# Patient Record
Sex: FEMALE | Race: WHITE | NOT HISPANIC OR LATINO | Employment: FULL TIME | URBAN - METROPOLITAN AREA
[De-identification: names, ages, dates, MRNs, and addresses within clinical notes are randomized per-mention and may not be internally consistent; named-entity substitution may affect disease eponyms.]

---

## 2020-01-31 ENCOUNTER — TELEPHONE (OUTPATIENT)
Dept: MAMMOGRAPHY | Facility: CLINIC | Age: 65
End: 2020-01-31

## 2020-01-31 NOTE — TELEPHONE ENCOUNTER
Left message for patient to call back. She needs to be aware to bring her images from [place] to her appointment with Dr. Hodges.

## 2020-02-04 ENCOUNTER — OFFICE VISIT (OUTPATIENT)
Dept: MAMMOGRAPHY | Facility: CLINIC | Age: 65
End: 2020-02-04

## 2020-02-04 VITALS
BODY MASS INDEX: 25.27 KG/M2 | HEIGHT: 67 IN | SYSTOLIC BLOOD PRESSURE: 140 MMHG | DIASTOLIC BLOOD PRESSURE: 80 MMHG | WEIGHT: 161 LBS

## 2020-02-04 DIAGNOSIS — Z90.10 POSTMASTECTOMY LYMPHANGIOSARCOMA SYNDROME, UNSPECIFIED LATERALITY (HCC): Primary | ICD-10-CM

## 2020-02-04 DIAGNOSIS — C50.919 POSTMASTECTOMY LYMPHANGIOSARCOMA SYNDROME, UNSPECIFIED LATERALITY (HCC): Primary | ICD-10-CM

## 2020-02-04 DIAGNOSIS — D05.10 DUCTAL CARCINOMA IN SITU (DCIS) OF BREAST, UNSPECIFIED LATERALITY: Primary | ICD-10-CM

## 2020-02-04 DIAGNOSIS — D05.11 DUCTAL CARCINOMA IN SITU (DCIS) OF RIGHT BREAST: Primary | ICD-10-CM

## 2020-02-04 PROCEDURE — 99205 OFFICE O/P NEW HI 60 MIN: CPT | Performed by: SURGERY

## 2020-02-04 RX ORDER — LEVETIRACETAM 500 MG/1
750 TABLET ORAL 2 TIMES DAILY
COMMUNITY
Start: 2019-06-19

## 2020-02-04 RX ORDER — VIT A/VIT C/VIT E/ZINC/COPPER 7160-113
TABLET, DELAYED RELEASE (ENTERIC COATED) ORAL
COMMUNITY
End: 2021-08-03

## 2020-02-04 NOTE — PROGRESS NOTES
Chief Complaint: Honey Avila is a 64 y.o.. female here today for Breast Cancer (Right DCIS)        History of Present Illness:  Patient presents with newly diagnosed DCIS. Right.  She is a very nice 64-year-old who is been having some short-term follow-up for abnormalities in the right breast.  Her most recent films revealed some some calcifications in the lower outer quadrant of the right breast that spanned a 1.3 cm area.  They were pleomorphic and felt to be suspicious.  An ultrasound of that right breast was obtained and there were no abnormalities.  The left breast also looked okay.  Biopsy for these abnormalities was recommended and has revealed high-grade DCIS that is ER negative and ID negative.  The largest measured extent of DCIS is 6 mm in a single core.  I have personally reviewed those imaging studies.  The calcifications are definitely pleomorphic and have a linear configuration.  I do agree that biopsy was indicated because these were suspicious.    The patient's family history is significant for a mother who had breast cancer in her 70s and a sister who had breast cancer at 63.  She also has a father with prostate cancer in his 70s.  To her knowledge there is been no definite genetic testing performed.    Despite multiple callback mammogram she has not had any prior breast biopsies.      Review of Systems:  Review of Systems   Skin:        The patient denies any noticeable changes to the skin of the breast.    All other systems reviewed and are negative.     I have reviewed the ROS as documented by the MA/LPN/RN Jerome Hodges MD      Past Medical and Surgical History:  Breast Biopsy History:  Patient has had the following breast biopsies:1/2020 Right-malignant  Breast Cancer HIstory:  Patient does not have a past medical history of breast cancer.  Breast Operations, and year:  None  Social History     Tobacco Use   Smoking Status Never Smoker     Obstetric History:  Patient is  postmenopausal, entered menopause naturally at age: 51   Number of pregnancies:3  Number of live births: 2  Number of abortions or miscarriages: 1  Age of delivery of first child: 34  Patient breast fed, for the following lenth of time:6 months  Length of time taking birth control pills:n/a  Patient has never taken hormone replacement    Past Surgical History:   Procedure Laterality Date   • BREAST BIOPSY Right 01/2020    malignant   • KNEE SURGERY      Both knees       Past Medical History:   Diagnosis Date   • Breast cancer (CMS/[facility]) 01/2020    Right DCIS   • Epilepsy (CMS/[facility])        Prior Hospitalizations, other than for surgery or childbirth, and year:  None    Social History:  Patient is .  Patient has one daughters. and Patient has one sons.    Family History:  Family History   Problem Relation Age of Onset   • Heart failure Mother    • Breast cancer Mother    • Heart failure Father    • Prostate cancer Father    • Breast cancer Sister        Vital Signs:  Vitals:    02/04/20 1135   BP: 140/80       Medications:    Current Outpatient Prescriptions:     Current Outpatient Medications:   •  levETIRAcetam (KEPPRA) 500 MG tablet, Take 500 mg by mouth 3 (Three) Times a Day., Disp: , Rfl:   •  Multiple Vitamins-Minerals (PRESERVISION AREDS 2 PO), Take  by mouth., Disp: , Rfl:   •  triamcinolone (KENALOG) 0.1 % ointment, APPLY TO AFFECTED AREA EVERY DAY X 2 WEEKS, Disp: , Rfl:   •  B COMPLEX VITAMINS ER PO, Take  by mouth., Disp: , Rfl:   •  Cholecalciferol (VITAMIN D-1000 MAX ST) 25 MCG (1000 UT) tablet, Take 2,000 Units by mouth Daily., Disp: , Rfl:   •  Multiple Vitamins-Minerals (ICAPS) tablet, Take  by mouth., Disp: , Rfl:     Physical Examination:  General Appearance:   Patient is in no distress.  She is well kept and has an average build.   Psychiatric:  Patient with appropriate mood and affect. Alert and oriented to self, time, and place.    Breast, RIGHT:  medium sized, symmetric with the  contralateral side.  Breast skin is without erythema, edema, rashes.  There is some bruising in the lower outer quadrant from her biopsy.  There are no visible abnormalities upon inspection during the arm-raising maneuver or with hands on hips in the sitting position. There is no nipple retraction, discharge or nipple/areolar skin changes.there is a palpable mass in that lower outer quadrant that measures about 2 cm and appears to be post biopsy related.    Breast, LEFT:  medium sized, symmetric with the contralateral side.  Breast skin is without erythema, edema, rashes.  There are no visible abnormalities upon inspection during the arm-raising maneuver or with hands on hips in the sitting position. There is no nipple retraction, discharge or nipple/areolar skin changes.There are no masses palpable in the sitting or supine positions.    Lymphatic:  There is no axillary, cervical, infraclavicular, or supraclavicular adenopathy bilaterally.  Eyes:  Pupils are round and reactive to light.  Cardiovascular:  Heart rate and rhythm are regular.  Respiratory:  Lungs are clear bilaterally with no crackles or wheezes in any lung field.  Gastrointestinal:  Abdomen is soft, nondistended, and nontender.  There was no obvious hepatosplenomegaly or abdominal mass.  There are no scars from previous surgery.    Musculoskeletal:  Good strength in all 4 extremities.   There is good range of motion in both shoulders.    Skin:  No new skin lesions or rashes on the skin excluding the breast (see breast exam above).    Cancer Staging  Primary Tumor: TIS  Regional Lymph Nodes:  N0  Distant Mets: M0  Clinical Stage Group: 0    Assessment:  1. Ductal carcinoma in situ (DCIS) of right breast          Plan:  She was accompanied today by her .  The office visit lasted 55 minutes with 47 minutes spent in face-to-face consultation.    We began the conversation discussing her pathology report.  We talked about the origin of most of breast  cancers from either the ducts or the lobules.  The difference between invasive and in situ disease was explained and the visual was drawn for her.  When invasion has occurred, the lymph nodes need to be evaluated.  When there is in situ disease the lymph nodes should be considered if the area of concern is widespread or it is high-grade.  We also discussed the significance of hormone receptors.  They often can give us some idea how the breast cancer will behave and potentially lead us to offer neoadjuvant chemotherapy.    Next we discussed the surgical options for DCIS which include breast conserving therapy versus a mastectomy.  With breast conserving therapy we are talking about a lumpectomy with margins and radiation treatment.  The radiation treatment is generally given to the entire breast for 6 weeks.  The side effects consist of local skin change and potential injury to nearby structures such as the lung or the heart.  A mastectomy would involve removing the breast tissues with preservation of the pectoral muscles and evaluation of the lymph nodes if indicated.  The potential for reconstruction by either an implant or autologous tissue was discussed.  This could be performed on a delayed basis or immediately depending on a multitude of factors.  The survival rates for these 2 procedures are equivalent but there is a slight increased risk of local recurrence following breast conserving surgery.  There are incidences where one may be favored over the other.  There are times when a lumpectomy is not possible due to the large tumor to breast ratio or the location of the tumor.  Previous radiation to the chest wall or collagen disorders such as scleroderma would make radiation treatment and possible and therefore exclude breast conserving therapy as an option.    I think it would probably be wise to obtain an MRI and to get genetics.  The patient is not totally sure which direction she would go from the surgical  standpoint.  She has had significant callbacks for abnormalities in the right breast and that would not change if she had breast conserving surgery.  On the other hand, a mastectomy particularly without reconstruction would be deforming and potentially create some balance issues.  She and her  will talk about these things while these other results are coming back.  CPT coding:    Next Appointment:  No follow-ups on file.            EMR Dragon/transcription disclaimer:    Much of this encounter note is an electronic transcription/translocation of spoken language to printed text.  The electronic translation of spoken language may permit erroneous, or at times, nonsensical words or phrases to be inadvertently transcribed.  Although I have reviewed the note from such areas, some may still exist.

## 2020-02-05 ENCOUNTER — DOCUMENTATION (OUTPATIENT)
Dept: MAMMOGRAPHY | Facility: CLINIC | Age: 65
End: 2020-02-05

## 2020-02-05 NOTE — PROGRESS NOTES
Sent letter to [facility] for them to obtain breast biopsy slides from [facility] for a second review.

## 2020-02-06 ENCOUNTER — TELEPHONE (OUTPATIENT)
Dept: MAMMOGRAPHY | Facility: CLINIC | Age: 65
End: 2020-02-06

## 2020-02-06 ENCOUNTER — LAB REQUISITION (OUTPATIENT)
Dept: LAB | Facility: HOSPITAL | Age: 65
End: 2020-02-06

## 2020-02-06 DIAGNOSIS — Z00.00 ENCOUNTER FOR GENERAL ADULT MEDICAL EXAMINATION WITHOUT ABNORMAL FINDINGS: ICD-10-CM

## 2020-02-06 NOTE — TELEPHONE ENCOUNTER
Attempted to call patient to set up a time for her to come in the office for genetic testing. I left a voice mail.

## 2020-02-07 ENCOUNTER — TELEPHONE (OUTPATIENT)
Dept: OTHER | Facility: HOSPITAL | Age: 65
End: 2020-02-07

## 2020-02-08 LAB
LAB AP CASE REPORT: NORMAL
LAB AP DIAGNOSIS COMMENT: NORMAL
PATH REPORT.ADDENDUM SPEC: NORMAL
PATH REPORT.FINAL DX SPEC: NORMAL
PATH REPORT.GROSS SPEC: NORMAL

## 2020-02-08 NOTE — TELEPHONE ENCOUNTER
Referral received from Dr. Hodges's office. I called Ms. Avila and introduced myself and navigational services. She stated her consult went well and she has a good understanding of her pathology and treatment options. She would like to have genetic testing and MRI done prior to making a surgery decision and those labs were drawn in Dr. Hodges's office. We set up a genetic counseling appointment with Dr. Henderson on March 24th and we discussed what that entails. She stated she is scheduled for a MRI on Feb 13th and was agreeable to meet afterward to go over navigational services and resources available. She has my contact number if any needs arise

## 2020-02-13 ENCOUNTER — NURSE NAVIGATOR (OUTPATIENT)
Dept: OTHER | Facility: HOSPITAL | Age: 65
End: 2020-02-13

## 2020-02-13 ENCOUNTER — HOSPITAL ENCOUNTER (OUTPATIENT)
Dept: MRI IMAGING | Facility: HOSPITAL | Age: 65
Discharge: HOME | End: 2020-02-13
Admitting: SURGERY

## 2020-02-13 DIAGNOSIS — D05.11 DUCTAL CARCINOMA IN SITU (DCIS) OF RIGHT BREAST: ICD-10-CM

## 2020-02-13 PROCEDURE — A9577 INJ MULTIHANCE: HCPCS | Performed by: SURGERY

## 2020-02-13 PROCEDURE — 77049 MRI BREAST C-+ W/CAD BI: CPT

## 2020-02-13 PROCEDURE — 82565 ASSAY OF CREATININE: CPT

## 2020-02-13 PROCEDURE — 0 GADOBENATE DIMEGLUMINE 529 MG/ML SOLUTION: Performed by: SURGERY

## 2020-02-13 RX ADMIN — GADOBENATE DIMEGLUMINE 15 ML: 529 INJECTION, SOLUTION INTRAVENOUS at 20:19

## 2020-02-13 NOTE — PROGRESS NOTES
Met Ms. Avila for an appointment today. I introduced myself and navigational services. She is scheduled for a MRI this evening and is waiting for her genetic testing results to come in. She will know more about plan of care afterward.  Her consult with Dr. Hodges went well and she has a good understanding of her pathology and treatment options. She had hector questions about her pathology and we went back over those together.      We discussed her support system and she stated her sister and her mother both had breast cancer and they have been very supportive. We also discussed integrative therapies and other services at the [facility]. I gave her a navigation folder with the following information:     [facility] Cancer Support Network, Sharing Our Stories Breast Cancer Support Group, Cancer and Restorative Exercise (CARE), [facility] program, Together for Breast Cancer Survival, For Women Facing Breast Cancer, Road to Recovery, Bioimpedeance, [facility], Massage Therapy, Reiki Therapy, [facility] [place], Cancer Nutrition, Survivorship Clinic, and Genetic Counseling.     She verbalized appreciation for navigational services and she has my contact information and will call with any questions that arise.

## 2020-02-14 ENCOUNTER — TELEPHONE (OUTPATIENT)
Dept: MAMMOGRAPHY | Facility: CLINIC | Age: 65
End: 2020-02-14

## 2020-02-14 DIAGNOSIS — D05.11 DUCTAL CARCINOMA IN SITU (DCIS) OF RIGHT BREAST: Primary | ICD-10-CM

## 2020-02-14 LAB — CREAT BLDA-MCNC: 0.7 MG/DL (ref 0.6–1.3)

## 2020-02-14 NOTE — TELEPHONE ENCOUNTER
She called back and I told her the MRI showed a 4.4 cm area of suspicious enhancement around the clip.  I think she would benefit from a mastectomy.  The other breast looked fine and we plan on just doing the right breast.  A referral has been made to Dr. Gore

## 2020-02-20 ENCOUNTER — PREP FOR SURGERY (OUTPATIENT)
Dept: OTHER | Facility: HOSPITAL | Age: 65
End: 2020-02-20

## 2020-02-20 DIAGNOSIS — D05.11 DUCTAL CARCINOMA IN SITU (DCIS) OF RIGHT BREAST: Primary | ICD-10-CM

## 2020-02-20 RX ORDER — LIDOCAINE AND PRILOCAINE 25; 25 MG/G; MG/G
CREAM TOPICAL ONCE
Status: CANCELLED | OUTPATIENT
Start: 2020-03-19 | End: 2020-02-20

## 2020-02-20 RX ORDER — ACETAMINOPHEN 500 MG
1000 TABLET ORAL ONCE
Status: CANCELLED | OUTPATIENT
Start: 2020-03-19 | End: 2020-02-20

## 2020-02-20 RX ORDER — CLINDAMYCIN PHOSPHATE 900 MG/50ML
900 INJECTION INTRAVENOUS ONCE
Status: CANCELLED | OUTPATIENT
Start: 2020-03-19 | End: 2020-02-20

## 2020-02-20 RX ORDER — DIAZEPAM 5 MG/1
10 TABLET ORAL ONCE
Status: CANCELLED | OUTPATIENT
Start: 2020-03-19 | End: 2020-02-20

## 2020-02-20 RX ORDER — CELECOXIB 200 MG/1
400 CAPSULE ORAL ONCE
Status: CANCELLED | OUTPATIENT
Start: 2020-03-19 | End: 2020-02-20

## 2020-02-24 PROBLEM — D05.11 DUCTAL CARCINOMA IN SITU (DCIS) OF RIGHT BREAST: Status: ACTIVE | Noted: 2020-02-24

## 2020-03-11 ENCOUNTER — APPOINTMENT (OUTPATIENT)
Dept: PREADMISSION TESTING | Facility: HOSPITAL | Age: 65
End: 2020-03-11

## 2020-03-11 VITALS
SYSTOLIC BLOOD PRESSURE: 131 MMHG | HEART RATE: 73 BPM | WEIGHT: 156 LBS | TEMPERATURE: 98 F | HEIGHT: 67 IN | BODY MASS INDEX: 24.48 KG/M2 | DIASTOLIC BLOOD PRESSURE: 78 MMHG | OXYGEN SATURATION: 100 % | RESPIRATION RATE: 20 BRPM

## 2020-03-11 DIAGNOSIS — D05.11 DUCTAL CARCINOMA IN SITU (DCIS) OF RIGHT BREAST: ICD-10-CM

## 2020-03-11 LAB
ALBUMIN SERPL-MCNC: 4.7 G/DL (ref 3.5–5.2)
ALBUMIN/GLOB SERPL: 2 G/DL
ALP SERPL-CCNC: 73 U/L (ref 39–117)
ALT SERPL W P-5'-P-CCNC: 14 U/L (ref 1–33)
ANION GAP SERPL CALCULATED.3IONS-SCNC: 12.8 MMOL/L (ref 5–15)
AST SERPL-CCNC: 17 U/L (ref 1–32)
BILIRUB SERPL-MCNC: 1 MG/DL (ref 0.2–1.2)
BUN BLD-MCNC: 11 MG/DL (ref 8–23)
BUN/CREAT SERPL: 15.1 (ref 7–25)
CALCIUM SPEC-SCNC: 9.7 MG/DL (ref 8.6–10.5)
CHLORIDE SERPL-SCNC: 102 MMOL/L (ref 98–107)
CO2 SERPL-SCNC: 25.2 MMOL/L (ref 22–29)
CREAT BLD-MCNC: 0.73 MG/DL (ref 0.57–1)
DEPRECATED RDW RBC AUTO: 42.1 FL (ref 37–54)
ERYTHROCYTE [DISTWIDTH] IN BLOOD BY AUTOMATED COUNT: 13.1 % (ref 12.3–15.4)
GFR SERPL CREATININE-BSD FRML MDRD: 81 ML/MIN/1.73
GLOBULIN UR ELPH-MCNC: 2.4 GM/DL
GLUCOSE BLD-MCNC: 96 MG/DL (ref 65–99)
HCT VFR BLD AUTO: 43.2 % (ref 34–46.6)
HGB BLD-MCNC: 14.5 G/DL (ref 12–15.9)
MCH RBC QN AUTO: 29.9 PG (ref 26.6–33)
MCHC RBC AUTO-ENTMCNC: 33.6 G/DL (ref 31.5–35.7)
MCV RBC AUTO: 89.1 FL (ref 79–97)
PLATELET # BLD AUTO: 278 10*3/MM3 (ref 140–450)
PMV BLD AUTO: 9.6 FL (ref 6–12)
POTASSIUM BLD-SCNC: 4.3 MMOL/L (ref 3.5–5.2)
PROT SERPL-MCNC: 7.1 G/DL (ref 6–8.5)
RBC # BLD AUTO: 4.85 10*6/MM3 (ref 3.77–5.28)
SODIUM BLD-SCNC: 140 MMOL/L (ref 136–145)
WBC NRBC COR # BLD: 6.28 10*3/MM3 (ref 3.4–10.8)

## 2020-03-11 PROCEDURE — 93010 ELECTROCARDIOGRAM REPORT: CPT | Performed by: INTERNAL MEDICINE

## 2020-03-11 PROCEDURE — 80053 COMPREHEN METABOLIC PANEL: CPT | Performed by: SURGERY

## 2020-03-11 PROCEDURE — 85027 COMPLETE CBC AUTOMATED: CPT | Performed by: SURGERY

## 2020-03-11 PROCEDURE — 93005 ELECTROCARDIOGRAM TRACING: CPT

## 2020-03-11 PROCEDURE — 36415 COLL VENOUS BLD VENIPUNCTURE: CPT

## 2020-03-11 RX ORDER — ASPIRIN 81 MG/1
81 TABLET ORAL DAILY
COMMUNITY
End: 2020-03-20 | Stop reason: HOSPADM

## 2020-03-11 NOTE — DISCHARGE INSTRUCTIONS
Take the following medications the morning of surgery: kera    Arrival time 5:30 am    General Instructions:  • Do not eat solid food after midnight the night before surgery.  • You may drink clear liquids day of surgery but must stop at least one hour before your hospital arrival time.  • It is beneficial for you to have a clear drink that contains carbohydrates the day of surgery.  We suggest a 12 to 20 ounce bottle of Gatorade or Powerade for non-diabetic patients or a 12 to 20 ounce bottle of G2 or Powerade Zero for diabetic patients. (Pediatric patients, are not advised to drink a 12 to 20 ounce carbohydrate drink)    Clear liquids are liquids you can see through.  Nothing red in color.     Plain water                               Sports drinks  Sodas                                   Gelatin (Jell-O)  Fruit juices without pulp such as white grape juice and apple juice  Popsicles that contain no fruit or yogurt  Tea or coffee (no cream or milk added)  Gatorade / Powerade  G2 / Powerade Zero    • Infants may have breast milk up to four hours before surgery.  • Infants drinking formula may drink formula up to six hours before surgery.   • Patients who avoid smoking, chewing tobacco and alcohol for 4 weeks prior to surgery have a reduced risk of post-operative complications.  Quit smoking as many days before surgery as you can.  • Do not smoke, use chewing tobacco or drink alcohol the day of surgery.   • If applicable bring your C-PAP/ BI-PAP machine.  • Bring any papers given to you in the doctor’s office.  • Wear clean comfortable clothes.  • Do not wear contact lenses, false eyelashes or make-up.  Bring a case for your glasses.   • Bring crutches or walker if applicable.  • Remove all piercings.  Leave jewelry and any other valuables at home.  • Hair extensions with metal clips must be removed prior to surgery.  • The Pre-Admission Testing nurse will instruct you to bring medications if unable to obtain an  accurate list in Pre-Admission Testing.        If you were given a blood bank ID arm band remember to bring it with you the day of surgery.    Preventing a Surgical Site Infection:  • For 2 to 3 days before surgery, avoid shaving with a razor because the razor can irritate skin and make it easier to develop an infection.    • Any areas of open skin can increase the risk of a post-operative wound infection by allowing bacteria to enter and travel throughout the body.  Notify your surgeon if you have any skin wounds / rashes even if it is not near the expected surgical site.  The area will need assessed to determine if surgery should be delayed until it is healed.  • The night prior to surgery shower using a fresh bar of anti-bacterial soap (such as Dial) and clean washcloth.  Sleep in a clean bed with clean clothing.  Do not allow pets to sleep with you.  • Shower on the morning of surgery using a fresh bar of anti-bacterial soap (such as Dial) and clean washcloth.  Dry with a clean towel and dress in clean clothing.  • Ask your surgeon if you will be receiving antibiotics prior to surgery.  • Make sure you, your family, and all healthcare providers clean their hands with soap and water or an alcohol based hand  before caring for you or your wound.    Day of surgery:  Your arrival time is approximately two hours before your scheduled surgery time.  Upon arrival, a Pre-op nurse and Anesthesiologist will review your health history, obtain vital signs, and answer questions you may have.  The only belongings needed at this time will be a list of your home medications and if applicable your C-PAP/BI-PAP machine.  If you are staying overnight your family can leave the rest of your belongings in the car and bring them to your room later.  A Pre-op nurse will start an IV and you may receive medication in preparation for surgery, including something to help you relax.  Your family will be able to see you in the  Pre-op area.  Two visitors at a time will be allowed in the Pre-op room.  While you are in surgery your family should notify the waiting room  if they leave the waiting room area and provide a contact phone number.    Please be aware that surgery does come with discomfort.  We want to make every effort to control your discomfort so please discuss any uncontrolled symptoms with your nurse.   Your doctor will most likely have prescribed pain medications.      If you are going home after surgery you will receive individualized written care instructions before being discharged.  A responsible adult must drive you to and from the hospital on the day of your surgery and stay with you for 24 hours.    If you are staying overnight following surgery, you will be transported to your hospital room following the recovery period.  [facility] has all private rooms.    If you have any questions please call Pre-Admission Testing at [phone].  Deductibles and co-payments are collected on the day of service. Please be prepared to pay the required co-pay, deductible or deposit on the day of service as defined by your plan.CHLORHEXIDINE CLOTH INSTRUCTIONS  The morning of surgery follow these instructions using the Chlorhexidine cloths you've been given.  These steps reduce bacteria on the body.  Do not use the cloths near your eyes, ears mouth, genitalia or on open wounds.  Throw the cloths away after use but do not try to flush them down a toilet.      • Open and remove one cloth at a time from the package.    • Leave the cloth unfolded and begin the bathing.  • Massage the skin with the cloths using gentle pressure to remove bacteria.  Do not scrub harshly.   • Follow the steps below with one 2% CHG cloth per area (6 total cloths).  • One cloth for neck, shoulders and chest.  • One cloth for both arms, hands, fingers and underarms (do underarms last).  • One cloth for the abdomen followed by  groin.  • One cloth for right leg and foot including between the toes.  • One cloth for left leg and foot including between the toes.  • The last cloth is to be used for the back of the neck, back and buttocks.    Allow the CHG to air dry 3 minutes on the skin which will give it time to work and decrease the chance of irritation.  The skin may feel sticky until it is dry.  Do not rinse with water or any other liquid or you will lose the beneficial effects of the CHG.  If mild skin irritation occurs, do rinse the skin to remove the CHG.  Report this to the nurse at time of admission.  Do not apply lotions, creams, ointments, deodorants or perfumes after using the clothes. Dress in clean clothes before coming to the hospital.

## 2020-03-12 ENCOUNTER — TELEPHONE (OUTPATIENT)
Dept: OTHER | Facility: HOSPITAL | Age: 65
End: 2020-03-12

## 2020-03-12 NOTE — TELEPHONE ENCOUNTER
Called MsMalachi Austin to see how she was doing and to answer some questions regarding bioimpedance. We discussed these questions and she understands how it works and that she should not need it at this moment. Dr. Hodges can always refer her later if anything is changed post surgery. Patient with verbal understanding. She has my contact information if any needs arise.

## 2020-03-18 DIAGNOSIS — D05.10 DUCTAL CARCINOMA IN SITU (DCIS) OF BREAST, UNSPECIFIED LATERALITY: ICD-10-CM

## 2020-03-18 DIAGNOSIS — D05.11 DUCTAL CARCINOMA IN SITU (DCIS) OF RIGHT BREAST: Primary | ICD-10-CM

## 2020-03-19 ENCOUNTER — ANESTHESIA (OUTPATIENT)
Dept: PERIOP | Facility: HOSPITAL | Age: 65
End: 2020-03-19

## 2020-03-19 ENCOUNTER — HOSPITAL ENCOUNTER (OUTPATIENT)
Facility: HOSPITAL | Age: 65
Discharge: HOME | End: 2020-03-20
Attending: SURGERY | Admitting: SURGERY

## 2020-03-19 ENCOUNTER — HOSPITAL ENCOUNTER (OUTPATIENT)
Dept: NUCLEAR MEDICINE | Facility: HOSPITAL | Age: 65
Discharge: HOME | End: 2020-03-19

## 2020-03-19 ENCOUNTER — ANESTHESIA EVENT (OUTPATIENT)
Dept: PERIOP | Facility: HOSPITAL | Age: 65
End: 2020-03-19

## 2020-03-19 DIAGNOSIS — D05.11 DUCTAL CARCINOMA IN SITU (DCIS) OF RIGHT BREAST: ICD-10-CM

## 2020-03-19 PROCEDURE — 25010000002 DEXAMETHASONE PER 1 MG: Performed by: NURSE ANESTHETIST, CERTIFIED REGISTERED

## 2020-03-19 PROCEDURE — C1789 PROSTHESIS, BREAST, IMP: HCPCS | Performed by: SURGERY

## 2020-03-19 PROCEDURE — 38900 IO MAP OF SENT LYMPH NODE: CPT | Performed by: SURGERY

## 2020-03-19 PROCEDURE — 25010000002 HYDROMORPHONE PER 4 MG: Performed by: NURSE ANESTHETIST, CERTIFIED REGISTERED

## 2020-03-19 PROCEDURE — C9290 INJ, BUPIVACAINE LIPOSOME: HCPCS | Performed by: SURGERY

## 2020-03-19 PROCEDURE — 25010000002 ONDANSETRON PER 1 MG: Performed by: NURSE ANESTHETIST, CERTIFIED REGISTERED

## 2020-03-19 PROCEDURE — 25010000002 GENTAMICIN PER 80 MG: Performed by: SURGERY

## 2020-03-19 PROCEDURE — 25010000002 DEXAMETHASONE PER 1 MG: Performed by: SURGERY

## 2020-03-19 PROCEDURE — 25010000002 PROPOFOL 10 MG/ML EMULSION: Performed by: NURSE ANESTHETIST, CERTIFIED REGISTERED

## 2020-03-19 PROCEDURE — 25010000002 FENTANYL CITRATE (PF) 100 MCG/2ML SOLUTION: Performed by: ANESTHESIOLOGY

## 2020-03-19 PROCEDURE — 19303 MAST SIMPLE COMPLETE: CPT | Performed by: REGISTERED NURSE

## 2020-03-19 PROCEDURE — 25010000002 ROPIVACAINE PER 1 MG: Performed by: SURGERY

## 2020-03-19 PROCEDURE — 19303 MAST SIMPLE COMPLETE: CPT | Performed by: SURGERY

## 2020-03-19 PROCEDURE — A9541 TC99M SULFUR COLLOID: HCPCS | Performed by: SURGERY

## 2020-03-19 PROCEDURE — 88307 TISSUE EXAM BY PATHOLOGIST: CPT | Performed by: SURGERY

## 2020-03-19 PROCEDURE — 25010000003 BUPIVACAINE LIPOSOME 1.3 % SUSPENSION: Performed by: SURGERY

## 2020-03-19 PROCEDURE — 25010000002 MIDAZOLAM PER 1 MG: Performed by: ANESTHESIOLOGY

## 2020-03-19 PROCEDURE — 25010000003 CEFAZOLIN PER 500 MG: Performed by: SURGERY

## 2020-03-19 PROCEDURE — 38792 RA TRACER ID OF SENTINL NODE: CPT

## 2020-03-19 PROCEDURE — 25010000002 NEOSTIGMINE PER 0.5 MG: Performed by: NURSE ANESTHETIST, CERTIFIED REGISTERED

## 2020-03-19 PROCEDURE — 0 TECHNETIUM FILTERED SULFUR COLLOID: Performed by: SURGERY

## 2020-03-19 PROCEDURE — 88331 PATH CONSLTJ SURG 1 BLK 1SPC: CPT | Performed by: SURGERY

## 2020-03-19 PROCEDURE — 38525 BIOPSY/REMOVAL LYMPH NODES: CPT | Performed by: SURGERY

## 2020-03-19 DEVICE — SMOOTH, HIGH PROFILE, SUTURE TABS, INTEGRAL INJECTION DOME, 600CC
Type: IMPLANTABLE DEVICE | Site: BREAST | Status: NON-FUNCTIONAL
Brand: ARTOURA BREAST TISSUE EXPANDER

## 2020-03-19 DEVICE — GRFT TISS ALLODERM CONTOUR RTM PERF 10.7X21.5 LG: Type: IMPLANTABLE DEVICE | Site: BREAST | Status: FUNCTIONAL

## 2020-03-19 RX ORDER — ONDANSETRON 2 MG/ML
4 INJECTION INTRAMUSCULAR; INTRAVENOUS EVERY 6 HOURS PRN
Status: DISCONTINUED | OUTPATIENT
Start: 2020-03-19 | End: 2020-03-20 | Stop reason: HOSPADM

## 2020-03-19 RX ORDER — EPHEDRINE SULFATE 50 MG/ML
5 INJECTION, SOLUTION INTRAVENOUS ONCE AS NEEDED
Status: DISCONTINUED | OUTPATIENT
Start: 2020-03-19 | End: 2020-03-19 | Stop reason: HOSPADM

## 2020-03-19 RX ORDER — DIPHENHYDRAMINE HYDROCHLORIDE 50 MG/ML
12.5 INJECTION INTRAMUSCULAR; INTRAVENOUS
Status: DISCONTINUED | OUTPATIENT
Start: 2020-03-19 | End: 2020-03-19 | Stop reason: HOSPADM

## 2020-03-19 RX ORDER — CLINDAMYCIN PHOSPHATE 900 MG/50ML
900 INJECTION INTRAVENOUS ONCE
Status: DISCONTINUED | OUTPATIENT
Start: 2020-03-19 | End: 2020-03-19 | Stop reason: HOSPADM

## 2020-03-19 RX ORDER — NALOXONE HCL 0.4 MG/ML
0.1 VIAL (ML) INJECTION
Status: DISCONTINUED | OUTPATIENT
Start: 2020-03-19 | End: 2020-03-20 | Stop reason: HOSPADM

## 2020-03-19 RX ORDER — CELECOXIB 200 MG/1
400 CAPSULE ORAL ONCE
Status: COMPLETED | OUTPATIENT
Start: 2020-03-19 | End: 2020-03-19

## 2020-03-19 RX ORDER — FLUMAZENIL 0.1 MG/ML
0.2 INJECTION INTRAVENOUS AS NEEDED
Status: DISCONTINUED | OUTPATIENT
Start: 2020-03-19 | End: 2020-03-19 | Stop reason: HOSPADM

## 2020-03-19 RX ORDER — PROMETHAZINE HYDROCHLORIDE 25 MG/ML
12.5 INJECTION, SOLUTION INTRAMUSCULAR; INTRAVENOUS ONCE AS NEEDED
Status: DISCONTINUED | OUTPATIENT
Start: 2020-03-19 | End: 2020-03-19 | Stop reason: HOSPADM

## 2020-03-19 RX ORDER — SODIUM CHLORIDE, SODIUM LACTATE, POTASSIUM CHLORIDE, CALCIUM CHLORIDE 600; 310; 30; 20 MG/100ML; MG/100ML; MG/100ML; MG/100ML
9 INJECTION, SOLUTION INTRAVENOUS CONTINUOUS
Status: DISCONTINUED | OUTPATIENT
Start: 2020-03-19 | End: 2020-03-19

## 2020-03-19 RX ORDER — LIDOCAINE AND PRILOCAINE 25; 25 MG/G; MG/G
CREAM TOPICAL ONCE
Status: COMPLETED | OUTPATIENT
Start: 2020-03-19 | End: 2020-03-19

## 2020-03-19 RX ORDER — HYDRALAZINE HYDROCHLORIDE 20 MG/ML
5 INJECTION INTRAMUSCULAR; INTRAVENOUS
Status: DISCONTINUED | OUTPATIENT
Start: 2020-03-19 | End: 2020-03-19 | Stop reason: HOSPADM

## 2020-03-19 RX ORDER — HYDROCODONE BITARTRATE AND ACETAMINOPHEN 7.5; 325 MG/1; MG/1
1 TABLET ORAL ONCE AS NEEDED
Status: DISCONTINUED | OUTPATIENT
Start: 2020-03-19 | End: 2020-03-19 | Stop reason: HOSPADM

## 2020-03-19 RX ORDER — CYCLOBENZAPRINE HCL 10 MG
5 TABLET ORAL 3 TIMES DAILY PRN
Status: DISCONTINUED | OUTPATIENT
Start: 2020-03-19 | End: 2020-03-20 | Stop reason: HOSPADM

## 2020-03-19 RX ORDER — ONDANSETRON 4 MG/1
4 TABLET, FILM COATED ORAL EVERY 6 HOURS PRN
Status: DISCONTINUED | OUTPATIENT
Start: 2020-03-19 | End: 2020-03-20 | Stop reason: HOSPADM

## 2020-03-19 RX ORDER — FENTANYL CITRATE 50 UG/ML
50 INJECTION, SOLUTION INTRAMUSCULAR; INTRAVENOUS
Status: DISCONTINUED | OUTPATIENT
Start: 2020-03-19 | End: 2020-03-19 | Stop reason: HOSPADM

## 2020-03-19 RX ORDER — MAGNESIUM HYDROXIDE 1200 MG/15ML
LIQUID ORAL AS NEEDED
Status: DISCONTINUED | OUTPATIENT
Start: 2020-03-19 | End: 2020-03-19 | Stop reason: HOSPADM

## 2020-03-19 RX ORDER — ACETAMINOPHEN 325 MG/1
650 TABLET ORAL ONCE AS NEEDED
Status: DISCONTINUED | OUTPATIENT
Start: 2020-03-19 | End: 2020-03-19 | Stop reason: HOSPADM

## 2020-03-19 RX ORDER — DEXAMETHASONE SODIUM PHOSPHATE 10 MG/ML
INJECTION INTRAMUSCULAR; INTRAVENOUS AS NEEDED
Status: DISCONTINUED | OUTPATIENT
Start: 2020-03-19 | End: 2020-03-19 | Stop reason: SURG

## 2020-03-19 RX ORDER — ACETAMINOPHEN 325 MG/1
975 TABLET ORAL ONCE
Status: DISCONTINUED | OUTPATIENT
Start: 2020-03-19 | End: 2020-03-19 | Stop reason: HOSPADM

## 2020-03-19 RX ORDER — NALOXONE HCL 0.4 MG/ML
0.2 VIAL (ML) INJECTION AS NEEDED
Status: DISCONTINUED | OUTPATIENT
Start: 2020-03-19 | End: 2020-03-19 | Stop reason: HOSPADM

## 2020-03-19 RX ORDER — HYDROXYZINE PAMOATE 50 MG/1
50 CAPSULE ORAL 4 TIMES DAILY PRN
Status: DISCONTINUED | OUTPATIENT
Start: 2020-03-19 | End: 2020-03-20 | Stop reason: HOSPADM

## 2020-03-19 RX ORDER — SODIUM CHLORIDE 0.9 % (FLUSH) 0.9 %
3 SYRINGE (ML) INJECTION EVERY 12 HOURS SCHEDULED
Status: DISCONTINUED | OUTPATIENT
Start: 2020-03-19 | End: 2020-03-19 | Stop reason: HOSPADM

## 2020-03-19 RX ORDER — PROPOFOL 10 MG/ML
VIAL (ML) INTRAVENOUS AS NEEDED
Status: DISCONTINUED | OUTPATIENT
Start: 2020-03-19 | End: 2020-03-19 | Stop reason: SURG

## 2020-03-19 RX ORDER — LABETALOL HYDROCHLORIDE 5 MG/ML
5 INJECTION, SOLUTION INTRAVENOUS
Status: DISCONTINUED | OUTPATIENT
Start: 2020-03-19 | End: 2020-03-19 | Stop reason: HOSPADM

## 2020-03-19 RX ORDER — SODIUM CHLORIDE, SODIUM LACTATE, POTASSIUM CHLORIDE, CALCIUM CHLORIDE 600; 310; 30; 20 MG/100ML; MG/100ML; MG/100ML; MG/100ML
50 INJECTION, SOLUTION INTRAVENOUS CONTINUOUS
Status: DISCONTINUED | OUTPATIENT
Start: 2020-03-19 | End: 2020-03-20 | Stop reason: HOSPADM

## 2020-03-19 RX ORDER — FAMOTIDINE 10 MG/ML
20 INJECTION, SOLUTION INTRAVENOUS ONCE
Status: COMPLETED | OUTPATIENT
Start: 2020-03-19 | End: 2020-03-19

## 2020-03-19 RX ORDER — ROPIVACAINE HYDROCHLORIDE 5 MG/ML
INJECTION, SOLUTION EPIDURAL; INFILTRATION; PERINEURAL AS NEEDED
Status: DISCONTINUED | OUTPATIENT
Start: 2020-03-19 | End: 2020-03-19 | Stop reason: HOSPADM

## 2020-03-19 RX ORDER — MIDAZOLAM HYDROCHLORIDE 1 MG/ML
2 INJECTION INTRAMUSCULAR; INTRAVENOUS
Status: DISCONTINUED | OUTPATIENT
Start: 2020-03-19 | End: 2020-03-19 | Stop reason: HOSPADM

## 2020-03-19 RX ORDER — DIPHENHYDRAMINE HCL 25 MG
25 CAPSULE ORAL
Status: DISCONTINUED | OUTPATIENT
Start: 2020-03-19 | End: 2020-03-19 | Stop reason: HOSPADM

## 2020-03-19 RX ORDER — CELECOXIB 200 MG/1
400 CAPSULE ORAL DAILY
Status: DISCONTINUED | OUTPATIENT
Start: 2020-03-20 | End: 2020-03-20 | Stop reason: HOSPADM

## 2020-03-19 RX ORDER — DIAZEPAM 5 MG/1
10 TABLET ORAL ONCE
Status: COMPLETED | OUTPATIENT
Start: 2020-03-19 | End: 2020-03-19

## 2020-03-19 RX ORDER — CYCLOBENZAPRINE HCL 10 MG
10 TABLET ORAL ONCE
Status: COMPLETED | OUTPATIENT
Start: 2020-03-19 | End: 2020-03-19

## 2020-03-19 RX ORDER — MIDAZOLAM HYDROCHLORIDE 1 MG/ML
1 INJECTION INTRAMUSCULAR; INTRAVENOUS
Status: DISCONTINUED | OUTPATIENT
Start: 2020-03-19 | End: 2020-03-19 | Stop reason: HOSPADM

## 2020-03-19 RX ORDER — ACETAMINOPHEN 500 MG
1000 TABLET ORAL ONCE
Status: COMPLETED | OUTPATIENT
Start: 2020-03-19 | End: 2020-03-19

## 2020-03-19 RX ORDER — SODIUM CHLORIDE, SODIUM LACTATE, POTASSIUM CHLORIDE, CALCIUM CHLORIDE 600; 310; 30; 20 MG/100ML; MG/100ML; MG/100ML; MG/100ML
INJECTION, SOLUTION INTRAVENOUS CONTINUOUS PRN
Status: DISCONTINUED | OUTPATIENT
Start: 2020-03-19 | End: 2020-03-19 | Stop reason: SURG

## 2020-03-19 RX ORDER — ONDANSETRON 2 MG/ML
4 INJECTION INTRAMUSCULAR; INTRAVENOUS ONCE AS NEEDED
Status: DISCONTINUED | OUTPATIENT
Start: 2020-03-19 | End: 2020-03-19 | Stop reason: HOSPADM

## 2020-03-19 RX ORDER — SODIUM CHLORIDE 0.9 % (FLUSH) 0.9 %
3-10 SYRINGE (ML) INJECTION AS NEEDED
Status: DISCONTINUED | OUTPATIENT
Start: 2020-03-19 | End: 2020-03-19 | Stop reason: HOSPADM

## 2020-03-19 RX ORDER — OXYCODONE AND ACETAMINOPHEN 10; 325 MG/1; MG/1
1 TABLET ORAL EVERY 4 HOURS PRN
Status: DISCONTINUED | OUTPATIENT
Start: 2020-03-19 | End: 2020-03-20 | Stop reason: HOSPADM

## 2020-03-19 RX ORDER — PROMETHAZINE HYDROCHLORIDE 25 MG/ML
6.25 INJECTION, SOLUTION INTRAMUSCULAR; INTRAVENOUS
Status: DISCONTINUED | OUTPATIENT
Start: 2020-03-19 | End: 2020-03-19 | Stop reason: HOSPADM

## 2020-03-19 RX ORDER — HYDROMORPHONE HYDROCHLORIDE 1 MG/ML
0.5 INJECTION, SOLUTION INTRAMUSCULAR; INTRAVENOUS; SUBCUTANEOUS
Status: DISCONTINUED | OUTPATIENT
Start: 2020-03-19 | End: 2020-03-19 | Stop reason: HOSPADM

## 2020-03-19 RX ORDER — HYDROXYZINE PAMOATE 50 MG/1
50 CAPSULE ORAL ONCE
Status: COMPLETED | OUTPATIENT
Start: 2020-03-19 | End: 2020-03-19

## 2020-03-19 RX ORDER — OXYCODONE AND ACETAMINOPHEN 7.5; 325 MG/1; MG/1
1 TABLET ORAL ONCE AS NEEDED
Status: DISCONTINUED | OUTPATIENT
Start: 2020-03-19 | End: 2020-03-19 | Stop reason: HOSPADM

## 2020-03-19 RX ORDER — CELECOXIB 200 MG/1
200 CAPSULE ORAL ONCE
Status: COMPLETED | OUTPATIENT
Start: 2020-03-19 | End: 2020-03-19

## 2020-03-19 RX ORDER — HYDROMORPHONE HCL 110MG/55ML
PATIENT CONTROLLED ANALGESIA SYRINGE INTRAVENOUS AS NEEDED
Status: DISCONTINUED | OUTPATIENT
Start: 2020-03-19 | End: 2020-03-19 | Stop reason: SURG

## 2020-03-19 RX ORDER — CLINDAMYCIN PHOSPHATE 900 MG/50ML
900 INJECTION INTRAVENOUS ONCE
Status: COMPLETED | OUTPATIENT
Start: 2020-03-19 | End: 2020-03-19

## 2020-03-19 RX ORDER — BISACODYL 10 MG
10 SUPPOSITORY, RECTAL RECTAL DAILY PRN
Status: DISCONTINUED | OUTPATIENT
Start: 2020-03-19 | End: 2020-03-20 | Stop reason: HOSPADM

## 2020-03-19 RX ORDER — CLINDAMYCIN PHOSPHATE 600 MG/50ML
600 INJECTION INTRAVENOUS EVERY 8 HOURS
Status: DISCONTINUED | OUTPATIENT
Start: 2020-03-19 | End: 2020-03-20 | Stop reason: HOSPADM

## 2020-03-19 RX ORDER — LIDOCAINE HYDROCHLORIDE 20 MG/ML
INJECTION, SOLUTION INFILTRATION; PERINEURAL AS NEEDED
Status: DISCONTINUED | OUTPATIENT
Start: 2020-03-19 | End: 2020-03-19 | Stop reason: SURG

## 2020-03-19 RX ORDER — ONDANSETRON 2 MG/ML
INJECTION INTRAMUSCULAR; INTRAVENOUS AS NEEDED
Status: DISCONTINUED | OUTPATIENT
Start: 2020-03-19 | End: 2020-03-19 | Stop reason: SURG

## 2020-03-19 RX ORDER — DOCUSATE SODIUM 100 MG/1
100 CAPSULE, LIQUID FILLED ORAL 2 TIMES DAILY
Status: DISCONTINUED | OUTPATIENT
Start: 2020-03-19 | End: 2020-03-20 | Stop reason: HOSPADM

## 2020-03-19 RX ORDER — DEXAMETHASONE SODIUM PHOSPHATE 4 MG/ML
8 INJECTION, SOLUTION INTRA-ARTICULAR; INTRALESIONAL; INTRAMUSCULAR; INTRAVENOUS; SOFT TISSUE ONCE
Status: COMPLETED | OUTPATIENT
Start: 2020-03-19 | End: 2020-03-19

## 2020-03-19 RX ORDER — SACCHAROMYCES BOULARDII 250 MG
500 CAPSULE ORAL 2 TIMES DAILY
Status: DISCONTINUED | OUTPATIENT
Start: 2020-03-19 | End: 2020-03-20 | Stop reason: HOSPADM

## 2020-03-19 RX ORDER — CELECOXIB 200 MG/1
400 CAPSULE ORAL ONCE
Status: DISCONTINUED | OUTPATIENT
Start: 2020-03-19 | End: 2020-03-19 | Stop reason: HOSPADM

## 2020-03-19 RX ORDER — PROMETHAZINE HYDROCHLORIDE 25 MG/1
25 SUPPOSITORY RECTAL ONCE AS NEEDED
Status: DISCONTINUED | OUTPATIENT
Start: 2020-03-19 | End: 2020-03-19 | Stop reason: HOSPADM

## 2020-03-19 RX ORDER — GLYCOPYRROLATE 0.2 MG/ML
INJECTION INTRAMUSCULAR; INTRAVENOUS AS NEEDED
Status: DISCONTINUED | OUTPATIENT
Start: 2020-03-19 | End: 2020-03-19 | Stop reason: SURG

## 2020-03-19 RX ORDER — MEPERIDINE HYDROCHLORIDE 25 MG/ML
12.5 INJECTION INTRAMUSCULAR; INTRAVENOUS; SUBCUTANEOUS
Status: DISCONTINUED | OUTPATIENT
Start: 2020-03-19 | End: 2020-03-19 | Stop reason: HOSPADM

## 2020-03-19 RX ORDER — LIDOCAINE HYDROCHLORIDE 10 MG/ML
0.5 INJECTION, SOLUTION EPIDURAL; INFILTRATION; INTRACAUDAL; PERINEURAL ONCE AS NEEDED
Status: DISCONTINUED | OUTPATIENT
Start: 2020-03-19 | End: 2020-03-19 | Stop reason: HOSPADM

## 2020-03-19 RX ORDER — HYDROMORPHONE HYDROCHLORIDE 1 MG/ML
0.25 INJECTION, SOLUTION INTRAMUSCULAR; INTRAVENOUS; SUBCUTANEOUS
Status: DISCONTINUED | OUTPATIENT
Start: 2020-03-19 | End: 2020-03-20 | Stop reason: HOSPADM

## 2020-03-19 RX ORDER — OXYCODONE HYDROCHLORIDE AND ACETAMINOPHEN 5; 325 MG/1; MG/1
1 TABLET ORAL EVERY 4 HOURS PRN
Status: DISCONTINUED | OUTPATIENT
Start: 2020-03-19 | End: 2020-03-20 | Stop reason: HOSPADM

## 2020-03-19 RX ORDER — PROMETHAZINE HYDROCHLORIDE 25 MG/1
25 TABLET ORAL ONCE AS NEEDED
Status: DISCONTINUED | OUTPATIENT
Start: 2020-03-19 | End: 2020-03-19 | Stop reason: HOSPADM

## 2020-03-19 RX ADMIN — GLYCOPYRROLATE 0.4 MG: 0.2 INJECTION INTRAMUSCULAR; INTRAVENOUS at 10:52

## 2020-03-19 RX ADMIN — HYDROMORPHONE HYDROCHLORIDE 0.5 MG: 2 INJECTION, SOLUTION INTRAMUSCULAR; INTRAVENOUS; SUBCUTANEOUS at 08:57

## 2020-03-19 RX ADMIN — TECHNETIUM TC 99M SULFUR COLLOID 1 DOSE: KIT at 07:08

## 2020-03-19 RX ADMIN — FAMOTIDINE 20 MG: 10 INJECTION, SOLUTION INTRAVENOUS at 07:31

## 2020-03-19 RX ADMIN — SODIUM CHLORIDE, POTASSIUM CHLORIDE, SODIUM LACTATE AND CALCIUM CHLORIDE 9 ML/HR: 600; 310; 30; 20 INJECTION, SOLUTION INTRAVENOUS at 07:24

## 2020-03-19 RX ADMIN — HYDROMORPHONE HYDROCHLORIDE 0.5 MG: 2 INJECTION, SOLUTION INTRAMUSCULAR; INTRAVENOUS; SUBCUTANEOUS at 09:19

## 2020-03-19 RX ADMIN — NEOSTIGMINE METHYLSULFATE 3 MG: 1 INJECTION INTRAMUSCULAR; INTRAVENOUS; SUBCUTANEOUS at 10:52

## 2020-03-19 RX ADMIN — HYDROXYZINE PAMOATE 50 MG: 50 CAPSULE ORAL at 06:08

## 2020-03-19 RX ADMIN — SODIUM CHLORIDE, POTASSIUM CHLORIDE, SODIUM LACTATE AND CALCIUM CHLORIDE 125 ML/HR: 600; 310; 30; 20 INJECTION, SOLUTION INTRAVENOUS at 17:46

## 2020-03-19 RX ADMIN — NITROGLYCERIN 1 INCH: 20 OINTMENT TOPICAL at 17:52

## 2020-03-19 RX ADMIN — CELECOXIB 200 MG: 200 CAPSULE ORAL at 12:02

## 2020-03-19 RX ADMIN — FENTANYL CITRATE 50 MCG: 50 INJECTION INTRAMUSCULAR; INTRAVENOUS at 08:42

## 2020-03-19 RX ADMIN — CLINDAMYCIN PHOSPHATE 900 MG: 18 INJECTION, SOLUTION INTRAMUSCULAR; INTRAVENOUS at 08:10

## 2020-03-19 RX ADMIN — PROPOFOL 150 MG: 10 INJECTION, EMULSION INTRAVENOUS at 08:13

## 2020-03-19 RX ADMIN — LIDOCAINE AND PRILOCAINE 1 APPLICATION: 25; 25 CREAM TOPICAL at 06:00

## 2020-03-19 RX ADMIN — Medication 500 MG: at 20:24

## 2020-03-19 RX ADMIN — ONDANSETRON HYDROCHLORIDE 4 MG: 2 SOLUTION INTRAMUSCULAR; INTRAVENOUS at 10:51

## 2020-03-19 RX ADMIN — CLINDAMYCIN PHOSPHATE 600 MG: 600 INJECTION, SOLUTION INTRAVENOUS at 17:50

## 2020-03-19 RX ADMIN — CYCLOBENZAPRINE 5 MG: 10 TABLET, FILM COATED ORAL at 22:10

## 2020-03-19 RX ADMIN — SODIUM CHLORIDE, POTASSIUM CHLORIDE, SODIUM LACTATE AND CALCIUM CHLORIDE: 600; 310; 30; 20 INJECTION, SOLUTION INTRAVENOUS at 07:06

## 2020-03-19 RX ADMIN — LIDOCAINE HYDROCHLORIDE 80 MG: 20 INJECTION, SOLUTION INFILTRATION; PERINEURAL at 08:13

## 2020-03-19 RX ADMIN — PROPOFOL 25 MCG/KG/MIN: 10 INJECTION, EMULSION INTRAVENOUS at 08:22

## 2020-03-19 RX ADMIN — DEXAMETHASONE SODIUM PHOSPHATE 8 MG: 10 INJECTION INTRAMUSCULAR; INTRAVENOUS at 08:22

## 2020-03-19 RX ADMIN — DOCUSATE SODIUM 100 MG: 100 CAPSULE, LIQUID FILLED ORAL at 20:24

## 2020-03-19 RX ADMIN — CELECOXIB 400 MG: 200 CAPSULE ORAL at 06:08

## 2020-03-19 RX ADMIN — CYCLOBENZAPRINE 10 MG: 10 TABLET, FILM COATED ORAL at 06:08

## 2020-03-19 RX ADMIN — MIDAZOLAM 2 MG: 1 INJECTION INTRAMUSCULAR; INTRAVENOUS at 08:10

## 2020-03-19 RX ADMIN — FENTANYL CITRATE 50 MCG: 50 INJECTION INTRAMUSCULAR; INTRAVENOUS at 08:13

## 2020-03-19 RX ADMIN — SODIUM CHLORIDE, POTASSIUM CHLORIDE, SODIUM LACTATE AND CALCIUM CHLORIDE: 600; 310; 30; 20 INJECTION, SOLUTION INTRAVENOUS at 09:37

## 2020-03-19 RX ADMIN — ACETAMINOPHEN 1000 MG: 500 TABLET, FILM COATED ORAL at 06:08

## 2020-03-19 RX ADMIN — LEVETIRACETAM 750 MG: 500 TABLET, FILM COATED ORAL at 20:24

## 2020-03-19 RX ADMIN — DIAZEPAM 10 MG: 5 TABLET ORAL at 06:08

## 2020-03-19 RX ADMIN — DEXAMETHASONE SODIUM PHOSPHATE 8 MG: 4 INJECTION, SOLUTION INTRAMUSCULAR; INTRAVENOUS at 07:23

## 2020-03-19 RX ADMIN — CYCLOBENZAPRINE 5 MG: 10 TABLET, FILM COATED ORAL at 12:02

## 2020-03-19 NOTE — ANESTHESIA PROCEDURE NOTES
Airway  Urgency: elective    Date/Time: 3/19/2020 8:15 AM  Airway not difficult    General Information and Staff    Patient location during procedure: OR  Anesthesiologist: Claudy Brady MD  CRNA: Jerome Godoy CRNA    Indications and Patient Condition  Indications for airway management: airway protection    Preoxygenated: yes  MILS not maintained throughout  Mask difficulty assessment: 1 - vent by mask    Final Airway Details  Final airway type: endotracheal airway      Successful airway: ETT  Cuffed: yes   Successful intubation technique: direct laryngoscopy  Facilitating devices/methods: anterior pressure/BURP  Endotracheal tube insertion site: oral  Blade: Poppy  Blade size: 3  ETT size (mm): 7.0  Cormack-Lehane Classification: grade I - full view of glottis  Placement verified by: chest auscultation   Cuff volume (mL): 6  Measured from: lips  ETT/EBT  to lips (cm): 20  Number of attempts at approach: 1  Assessment: lips, teeth, and gum same as pre-op and atraumatic intubation    Additional Comments  Pre O2, SIAI

## 2020-03-19 NOTE — OP NOTE
Preoperative diagnosis: Right breast cancer  Postoperative diagnosis: Same  Procedure: Immediate right breast reconstruction with placement of [place] Mayo 600 cc high-profile tissue expander filled with 150 cc today and placement of AlloDerm 164 cm² large thin contour perforated  Surgeon: Robert Gore MD  Anesthesia: General tracheal  Drains x3  Estimated blood loss: 10 cc for the reconstruction  Complications none apparent  Indications for procedure this nice 64-year-old is going to have a mastectomy by Dr. Alfonso Hodges she request breast reconstruction and understands the risk benefits and complications associated with tissue expander implant type reconstruction she is reviewed the informed consent from the [employer] of plastic surgeons initialed and signed this and have answered all of her questions she is marked prior to surgery with a skin sparing incision she had no further questions this morning prior to surgery and is ready to proceed she understands I cannot make a got given breast she also understands of expanders get infected or exposed or if there healing problems that they might have to be removed which would cut short her reconstruction for some period of time.  Additional surgeries will be needed to complete her reconstruction.  Procedure: Patient initiated her surgery with the mastectomy and sentinel node biopsies by Dr. Hodges he will dictate this under separate cover.  Following the mastectomy and sentinel node biopsies we began developing a subpectoral pocket after painting the skin with additional Betadine and irrigating with our antibiotic containing saline and 50% Betadine.  We developed a subpectoral pocket leaving the fascia intact but releasing the muscle inferiorly and just a bit inferior medially it was clear that the muscle would not cover the expander and we opened a piece of large thin contour perforated AlloDerm this was soaked in 2 separate baths for over 20 minutes  on each side and soaked in antibiotic containing saline and our 50% Betadine.  We sutured this along the inferior and lateral aspects of the proposed reconstructive pocket and captured the lateral extent of the dissection to close down the lateral pocket with a 2-0 PDS as we sewed the AlloDerm in.  Hemostasis was excellent we then injected 20 cc of Exparel and 30 cc of ropivacaine in the serratus musculature between the 2 pectoral muscles beneath the pectoralis minor and into the subcutaneous tissues around her mastectomy for postoperative analgesia.  We then placed a 15 Malian Guillermo end of the deep pocket and secured it with a nylon suture and a 15 Malian Guillermo drain placed beneath the inferior flap outside of the reconstructive pocket and then a 10 Malian axion beneath the superior mastectomy flap. hemostasis was excellent we  inspected thoroughly and cauterized all bleeding points thoroughly.  Using a fresh pair powder free gloves we opened a Art Mayo 600 cc high-profile expander is free from any gross defects all the air was evacuated from it it was bathed in antibiotic containing saline and 50% Betadine we carefully positioned and into the subpectoral pocket and secured the lateral tab using a closed system technique we filled it with 150 cc of saline today.  We then carefully tailored the AlloDerm and closed the free edge of the AlloDerm to the free edge of the pectoral muscle and the inferior lateral aspects extending up toward the axilla using running 2-0 PDS suture we position her drains appropriately irrigated with additional antibiotic containing saline hemostasis was excellent we then tacked the inferior flap with 2-0 Vicryl in several locations to the AlloDerm.  There is no undue tension on the skin closure the skin flaps came together quite nicely and appeared quite healthy and viable.  We reapproximated the deep dermis with multiple 4-0 Vicryls and then running 5-0 PDS subcuticular sutures used  to complete the layered closure Dermabond was applied over this and then nitroglycerin paste was applied as a preventative measure to the mastectomy flaps.  We placed Biopatch is at the drain exit sites then 2 x 2 gauzes and tape Telfa was applied over the nitroglycerin tape in the incisions then ABD pads and a 6 inch Ace wrap for gently compressive dressing.  Needle and sponge counts were correct x2 and she went to recovery in satisfactory condition.

## 2020-03-19 NOTE — OP NOTE
Name: Honey Avila  Age: 64 y.o.  Sex: female  :  1955  MRN: [id number]    Mastectomy with SN Procedure Note    Indications: This patient presents for surgical treatment of Breast Cancer involving the right breast.    Pre-operative Diagnosis: DCIS right breast    Post-operative Diagnosis: same    Procedure:  right total mastectomy, [place]fe[place] Node    Surgeon: Jerome Hodges MD, FACS    Assistants: SANJEEV Al    Anesthesia: General anesthesia      Procedure Details   The patient was seen again in the Holding Room. The risks, benefits, indications, potential complications, treatment options, and expected outcomes were discussed with the patient. The possibilities of reaction to medication, pulmonary aspiration, bleeding, recurrent infection, the need for additional procedures, failure to diagnose a condition, and creating a complication requiring transfusion or further operation were discussed with the patient. The patient and/or family concurred with the proposed plan, giving informed consent. The site of surgery was properly noted/marked.    The patient was also taken to the nuclear med department where a radioisotope injection was performed in the periareolar area of the affected breast  in the usual fashion.       The patient was taken to the Operating Room, identified as Honey Avila  and the procedure verified as right total mastectomy with sentinel lymph node biopsy.   A Time Out was held and the above information confirmed.          The patient was placed supine and general anesthetic was administered.     4 cc of blue dye were injected into the breast near the edge of the areola.  The  arm, breast, and chest were prepped and draped in standard fashion.   An oblique elliptical incision was made encompassing the nipple of the .right breast.  . Skin flaps were created meticulously to preserve the subdermal blood supply.  Flaps were developed to the clavicle, rectus sheath,  sternum, and anterior edge of the latissimus dorsi m.    [place] node evaluation was performed. 3 sentinel node(s) was/were found and removed.  The first sentinel lymph node had counts as high as 643 and it was blue.  The second sentinel lymph node was deep and had counts as high as 583 and it was not blue.  The third lymph node was also deep and not blue.  Its counts were as high as 101. No other nodes were found with counts over 65 and there were no other blue nodes.  Frozen section was performed and all 3 lymph nodes were benign.     Dissection was carried down to the pectoralis fascia, which was included with the specimen, and an axillary dissection  was not performed.,       The specimen was submitted to pathology. The wound was irrigated.  And hemostasis obtained.        Instrument, sponge, and needle counts were correct at the conclusion of my portion of the case.    Findings: There were no unexpected findings.    Estimated Blood Loss: less than 50 mL               Specimens:   ID Type Source Tests Collected by Time   A : RIGHT BREAST STITCH MARKS 12:00 FRESH FOR PERMANENT Tissue Breast, Right TISSUE PATHOLOGY EXAM Jerome Hodges MD 3/19/2020 0924   B : SENTINEL NODE #1 RIGHT AXILLA Tissue [place] Lymph Node TISSUE PATHOLOGY EXAM Jerome Hodges MD 3/19/2020 0920   C : SENTINEL NODE #2 RIGHT AXILLA Tissue [place] Lymph Node TISSUE PATHOLOGY EXAM Jerome Hodges MD 3/19/2020 9974   D : SENTINEL NODE #3 RIGHT AXILLA Tissue [place] Lymph Node TISSUE PATHOLOGY EXAM Jerome Hodges MD 3/19/2020 0957       Complications: None; patient tolerated the procedure well.           Disposition: PACU - hemodynamically stable.           Condition: stable

## 2020-03-19 NOTE — ANESTHESIA POSTPROCEDURE EVALUATION
Patient: Honey Avila    Procedure Summary     Date:  03/19/20 Room / Location:  [facility] OR 06 / [facility] MAIN OR    Anesthesia Start:  0808 Anesthesia Stop:  1124    Procedures:       BREAST MASTECTOMY WITH SENTINEL NODE BIOPSY (Right Breast)      RIGHT PLACEMENT OF TISSUE EXPANDER AND ALLODERM (Right Breast) Diagnosis:       Ductal carcinoma in situ (DCIS) of right breast      (Ductal carcinoma in situ (DCIS) of right breast [D05.11])    Surgeon:  Jerome Hodges MD; ABBIE Gore MD Provider:  Claudy Brady MD    Anesthesia Type:  general ASA Status:  2          Anesthesia Type: general    Vitals  Vitals Value Taken Time   /69 3/19/2020 11:35 AM   Temp     Pulse 80 3/19/2020 11:40 AM   Resp 16 3/19/2020 11:22 AM   SpO2 100 % 3/19/2020 11:40 AM   Vitals shown include unvalidated device data.        Post Anesthesia Care and Evaluation    Patient location during evaluation: PACU  Patient participation: complete - patient participated  Level of consciousness: awake and alert  Pain management: adequate  Airway patency: patent  Anesthetic complications: No anesthetic complications    Cardiovascular status: acceptable  Respiratory status: acceptable  Hydration status: acceptable    Comments: --------------------            03/19/20               1130     --------------------   BP:       116/77     Pulse:      89       Resp:                Temp:                SpO2:      100%     --------------------

## 2020-03-19 NOTE — H&P
History of Present Illness:   She is a very nice 64-year-old who is been having some short-term follow-up for abnormalities in the right breast.  Her most recent films revealed some some calcifications in the lower outer quadrant of the right breast that spanned a 1.3 cm area.  They were pleomorphic and felt to be suspicious.  An ultrasound of that right breast was obtained and there were no abnormalities.  The left breast also looked okay.  Biopsy for these abnormalities was recommended and has revealed high-grade DCIS that is ER negative and [place] negative.  The largest measured extent of DCIS is 6 mm in a single core.   The patient went on to have an MRI which shows a 4.4 cm area of suspicious enhancement around the clip.  .     .           Past Medical and Surgical History:       Surgical History         Past Surgical History:   Procedure Laterality Date   • BREAST BIOPSY Right 01/2020     malignant   • KNEE SURGERY         Both knees            Medical History        Past Medical History:   Diagnosis Date   • Breast cancer (CMS/HCC) 01/2020     Right DCIS   • Epilepsy (CMS/HCC)                   Family History:        Family History   Problem Relation Age of Onset   • Heart failure Mother     • Breast cancer Mother     • Heart failure Father     • Prostate cancer Father     • Breast cancer Sister           Vital Signs: Blood pressure 123/83, pulse 78, temperature 98.3       Medications:     Current Outpatient Prescriptions:      Current Outpatient Medications:   •  levETIRAcetam (KEPPRA) 500 MG tablet, Take 500 mg by mouth 3 (Three) Times a Day., Disp: , Rfl:   •  Multiple Vitamins-Minerals (PRESERVISION AREDS 2 PO), Take  by mouth., Disp: , Rfl:   •  triamcinolone (KENALOG) 0.1 % ointment, APPLY TO AFFECTED AREA EVERY DAY X 2 WEEKS, Disp: , Rfl:   •  B COMPLEX VITAMINS ER PO, Take  by mouth., Disp: , Rfl:   •  Cholecalciferol (VITAMIN D-1000 MAX ST) 25 MCG (1000 UT) tablet, Take 2,000 Units by mouth Daily.,  Disp: , Rfl:   •  Multiple Vitamins-Minerals (ICAPS) tablet, Take  by mouth., Disp: , Rfl:      Physical Examination:  General Appearance:   Patient is in no distress.  She is well kept and has an average build.   Psychiatric:  Patient with appropriate mood and affect. Alert and oriented to self, time, and place.     Breast, RIGHT:  medium sized, symmetric with the contralateral side.  Breast skin is without erythema, edema, rashes.    There are drawings in place by the plastic surgeons.            Cardiovascular:  Heart rate and rhythm are regular.  Respiratory:  Lungs are clear bilaterally with no crackles or wheezes in any lung field.  Gastrointestinal:  Abdomen is soft, nondistended, and nontender.  There was no obvious hepatosplenomegaly or abdominal mass.  There are no scars from previous surgery.       Assessment:  1. Ductal carcinoma in situ (DCIS) of right breast      Plan- the patient prefers a mastectomy based on the large extent of suspicious enhancement.  She is desirous of immediate reconstruction and that will be performed.  We will also perform a sentinel lymph node biopsy.

## 2020-03-19 NOTE — ANESTHESIA PREPROCEDURE EVALUATION
Anesthesia Evaluation     Patient summary reviewed and Nursing notes reviewed   history of anesthetic complications: PONV               Airway   Mallampati: II  Dental      Pulmonary - negative pulmonary ROS   Cardiovascular - negative cardio ROS    ECG reviewed  Rate: normal        Neuro/Psych  (+) seizures,     GI/Hepatic/Renal/Endo - negative ROS     Musculoskeletal     Abdominal    Substance History - negative use     OB/GYN negative ob/gyn ROS         Other   arthritis,    history of cancer                    Anesthesia Plan    ASA 2     general   (Severe nausea with past anesthetics except propofol colonoscopies  Consider background propofol ggt during procedure)  intravenous induction     Anesthetic plan, all risks, benefits, and alternatives have been provided, discussed and informed consent has been obtained with: patient.

## 2020-03-20 VITALS
HEIGHT: 67 IN | TEMPERATURE: 97.8 F | RESPIRATION RATE: 16 BRPM | WEIGHT: 157 LBS | HEART RATE: 92 BPM | DIASTOLIC BLOOD PRESSURE: 57 MMHG | BODY MASS INDEX: 24.64 KG/M2 | OXYGEN SATURATION: 98 % | SYSTOLIC BLOOD PRESSURE: 103 MMHG

## 2020-03-20 LAB
CYTO UR: NORMAL
LAB AP CASE REPORT: NORMAL
LAB AP SYNOPTIC CHECKLIST: NORMAL
Lab: NORMAL
PATH REPORT.FINAL DX SPEC: NORMAL
PATH REPORT.GROSS SPEC: NORMAL

## 2020-03-20 RX ADMIN — Medication 500 MG: at 08:24

## 2020-03-20 RX ADMIN — CELECOXIB 400 MG: 200 CAPSULE ORAL at 08:24

## 2020-03-20 RX ADMIN — MUPIROCIN: 20 OINTMENT TOPICAL at 08:25

## 2020-03-20 RX ADMIN — POLYETHYLENE GLYCOL 3350 17 G: 17 POWDER, FOR SOLUTION ORAL at 08:24

## 2020-03-20 RX ADMIN — CLINDAMYCIN PHOSPHATE 600 MG: 600 INJECTION, SOLUTION INTRAVENOUS at 08:26

## 2020-03-20 RX ADMIN — DOCUSATE SODIUM 100 MG: 100 CAPSULE, LIQUID FILLED ORAL at 08:25

## 2020-03-20 RX ADMIN — NITROGLYCERIN 1 INCH: 20 OINTMENT TOPICAL at 00:03

## 2020-03-20 RX ADMIN — CLINDAMYCIN PHOSPHATE 600 MG: 600 INJECTION, SOLUTION INTRAVENOUS at 02:09

## 2020-03-20 RX ADMIN — LEVETIRACETAM 750 MG: 500 TABLET, FILM COATED ORAL at 08:24

## 2020-03-20 NOTE — PROGRESS NOTES
Discharge Planning Assessment  [facility]     Patient Name: Honey Avila  MRN: [id number]  Today's Date: 3/20/2020    Admit Date: 3/19/2020    Discharge Needs Assessment    No documentation.       Discharge Plan     Row Name 03/20/20 0940       Plan    Plan  Home     Final Discharge Disposition Code  01 - home or self-care    Final Note  Home        Destination      Coordination has not been started for this encounter.      Durable Medical Equipment      Coordination has not been started for this encounter.      Dialysis/Infusion      Coordination has not been started for this encounter.      Home Medical Care      Coordination has not been started for this encounter.      Therapy      Coordination has not been started for this encounter.      Community Resources      Coordination has not been started for this encounter.        Expected Discharge Date and Time     Expected Discharge Date Expected Discharge Time    Mar 20, 2020         Demographic Summary    No documentation.       Functional Status    No documentation.       Psychosocial    No documentation.       Abuse/Neglect    No documentation.       Legal    No documentation.       Substance Abuse    No documentation.       Patient Forms    No documentation.           Maria Del Carmen Trujillo RN

## 2020-03-20 NOTE — PROGRESS NOTES
Assessment/Plan Patient is postoperative day #1 from a right mastectomy sentinel node biopsy and immediate reconstruction with tissue expander and AlloDerm by Aga Hodges and Bao respectively.  For diagnosis of right breast cancer (DCIS).  She is done well overnight she is yet to void Nieves cath was removed at 4 AM pain control is excellent operative sites look very good she is discharged home with restrictions to keeping her elbow at her side she has at least a 5 pound restriction on weight on the right hand and she is not to use her hand for much at all and use the sling as a reminder.  Her diet is regular her follow-up is with me Dr. Gore on Wednesday in the office she is to call if she has any problems or concerns [phone] she is instructed in drain care and given a copy of my tissue expander discharge instructions and taught by the nursing staff because of her major surgery she will require greater than 3 days of opioid narcotics but have encouraged her to use as little of these as possible.  She will follow-up with Dr. Watson in the next few weeks and he will call her with the pathology report as it becomes available  Subjective Patient actually denies pain she did take a muscle relaxer and she was on some Celebrex and Flexeril yesterday she has not used a pain pill.  Yet to void    Temp:  [96.9 °F (36.1 °C)-98.1 °F (36.7 °C)] 98.1 °F (36.7 °C)  Heart Rate:  [] 86  Resp:  [16] 16  BP: (101-118)/(54-79) 102/54  I/O last 3 completed shifts:  In: 2141.8 [I.V.:2091.8; IV Piggyback:50]  Out: 1370 [Urine:1325; Drains:45]  I/O this shift:  In: 480 [P.O.:480]  Out: 2353 [Urine:2300; Drains:53]    Objective  Operative site looks very good skin is appropriate drains are appropriate no evidence of hematoma or collections circulation appears good skin is under no tension right hand is neurovascularly intact.  She is moving well and moving air well with the incentive spirometer      Ductal carcinoma in situ (DCIS)  of right breast

## 2020-03-20 NOTE — DISCHARGE SUMMARY
Assessment/Plan Patient is postoperative day #1 from a right mastectomy sentinel node biopsy and immediate reconstruction with tissue expander and AlloDerm by Aga Hodges and Bao respectively.  For diagnosis of right breast cancer (DCIS).  She is done well overnight she is yet to void Nieves cath was removed at 4 AM pain control is excellent operative sites look very good she is discharged home with restrictions to keeping her elbow at her side she has at least a 5 pound restriction on weight on the right hand and she is not to use her hand for much at all and use the sling as a reminder.  Her diet is regular her follow-up is with me Dr. Gore on Wednesday in the office she is to call if she has any problems or concerns [phone] she is instructed in drain care and given a copy of my tissue expander discharge instructions and taught by the nursing staff because of her major surgery she will require greater than 3 days of opioid narcotics but have encouraged her to use as little of these as possible.  She will follow-up with Dr. Watson in the next few weeks and he will call her with the pathology report as it becomes available  Subjective Patient actually denies pain she did take a muscle relaxer and she was on some Celebrex and Flexeril yesterday she has not used a pain pill.  Yet to void    Temp:  [96.9 °F (36.1 °C)-98.1 °F (36.7 °C)] 98.1 °F (36.7 °C)  Heart Rate:  [] 86  Resp:  [16] 16  BP: (101-118)/(54-79) 102/54  I/O last 3 completed shifts:  In: 2141.8 [I.V.:2091.8; IV Piggyback:50]  Out: 1370 [Urine:1325; Drains:45]  I/O this shift:  In: 480 [P.O.:480]  Out: 2353 [Urine:2300; Drains:53]    Objective  Operative site looks very good skin is appropriate drains are appropriate no evidence of hematoma or collections circulation appears good skin is under no tension right hand is neurovascularly intact.  She is moving well and moving air well with the incentive spirometer      Ductal carcinoma in situ (DCIS)  of right breast

## 2020-03-20 NOTE — PROGRESS NOTES
Discharge Planning Assessment  [facility]     Patient Name: Honey Avila  MRN: [id number]  Today's Date: 3/20/2020    Admit Date: 3/19/2020    Discharge Needs Assessment    No documentation.       Discharge Plan     Row Name 03/20/20 1006       Plan    Plan  Home     Final Discharge Disposition Code  01 - home or self-care    Final Note  Home     Row Name 03/20/20 0940       Plan    Plan  Home     Final Discharge Disposition Code  01 - home or self-care    Final Note  Home        Destination      Coordination has not been started for this encounter.      Durable Medical Equipment      Coordination has not been started for this encounter.      Dialysis/Infusion      Coordination has not been started for this encounter.      Home Medical Care      Coordination has not been started for this encounter.      Therapy      Coordination has not been started for this encounter.      Community Resources      Coordination has not been started for this encounter.        Expected Discharge Date and Time     Expected Discharge Date Expected Discharge Time    Mar 20, 2020         Demographic Summary    No documentation.       Functional Status    No documentation.       Psychosocial    No documentation.       Abuse/Neglect    No documentation.       Legal    No documentation.       Substance Abuse    No documentation.       Patient Forms    No documentation.           Maria Del Carmen Trujillo RN

## 2020-03-20 NOTE — PLAN OF CARE
Problem: Patient Care Overview  Goal: Plan of Care Review  Outcome: Ongoing (interventions implemented as appropriate)  Flowsheets (Taken 3/20/2020 3391)  Progress: improving  Plan of Care Reviewed With: patient  Note:   No complaints of pain, advised to take pain pill for future, agreed to muscle relaxant. VSS. Ambulate in halls with assist. Educated on arm restrictions and drain care. Will cont to monitor.

## 2020-03-23 ENCOUNTER — TELEPHONE (OUTPATIENT)
Dept: MAMMOGRAPHY | Facility: CLINIC | Age: 65
End: 2020-03-23

## 2020-03-26 ENCOUNTER — LAB (OUTPATIENT)
Dept: LAB | Facility: HOSPITAL | Age: 65
End: 2020-03-26

## 2020-03-26 ENCOUNTER — CONSULT (OUTPATIENT)
Dept: ONCOLOGY | Facility: CLINIC | Age: 65
End: 2020-03-26

## 2020-03-26 VITALS
BODY MASS INDEX: 24.42 KG/M2 | HEIGHT: 67 IN | RESPIRATION RATE: 16 BRPM | SYSTOLIC BLOOD PRESSURE: 161 MMHG | OXYGEN SATURATION: 100 % | HEART RATE: 96 BPM | TEMPERATURE: 97.9 F | WEIGHT: 155.6 LBS | DIASTOLIC BLOOD PRESSURE: 88 MMHG

## 2020-03-26 DIAGNOSIS — Z00.00 ENCOUNTER FOR GENERAL ADULT MEDICAL EXAMINATION WITHOUT ABNORMAL FINDINGS: Primary | ICD-10-CM

## 2020-03-26 DIAGNOSIS — D05.11 DUCTAL CARCINOMA IN SITU (DCIS) OF RIGHT BREAST: Primary | ICD-10-CM

## 2020-03-26 DIAGNOSIS — C50.919 MALIGNANT NEOPLASM OF FEMALE BREAST, UNSPECIFIED ESTROGEN RECEPTOR STATUS, UNSPECIFIED LATERALITY, UNSPECIFIED SITE OF BREAST (HCC): ICD-10-CM

## 2020-03-26 LAB
BASOPHILS # BLD AUTO: 0.03 10*3/MM3 (ref 0–0.2)
BASOPHILS NFR BLD AUTO: 0.3 % (ref 0–1.5)
DEPRECATED RDW RBC AUTO: 42.8 FL (ref 37–54)
EOSINOPHIL # BLD AUTO: 0.15 10*3/MM3 (ref 0–0.4)
EOSINOPHIL NFR BLD AUTO: 1.7 % (ref 0.3–6.2)
ERYTHROCYTE [DISTWIDTH] IN BLOOD BY AUTOMATED COUNT: 13.2 % (ref 12.3–15.4)
HCT VFR BLD AUTO: 41.3 % (ref 34–46.6)
HGB BLD-MCNC: 14.3 G/DL (ref 12–15.9)
IMM GRANULOCYTES # BLD AUTO: 0.09 10*3/MM3 (ref 0–0.05)
IMM GRANULOCYTES NFR BLD AUTO: 1 % (ref 0–0.5)
LYMPHOCYTES # BLD AUTO: 1.81 10*3/MM3 (ref 0.7–3.1)
LYMPHOCYTES NFR BLD AUTO: 20.9 % (ref 19.6–45.3)
MCH RBC QN AUTO: 30.7 PG (ref 26.6–33)
MCHC RBC AUTO-ENTMCNC: 34.6 G/DL (ref 31.5–35.7)
MCV RBC AUTO: 88.6 FL (ref 79–97)
MONOCYTES # BLD AUTO: 0.79 10*3/MM3 (ref 0.1–0.9)
MONOCYTES NFR BLD AUTO: 9.1 % (ref 5–12)
NEUTROPHILS # BLD AUTO: 5.79 10*3/MM3 (ref 1.7–7)
NEUTROPHILS NFR BLD AUTO: 67 % (ref 42.7–76)
NRBC BLD AUTO-RTO: 0 /100 WBC (ref 0–0.2)
PLATELET # BLD AUTO: 256 10*3/MM3 (ref 140–450)
PMV BLD AUTO: 9.3 FL (ref 6–12)
RBC # BLD AUTO: 4.66 10*6/MM3 (ref 3.77–5.28)
WBC NRBC COR # BLD: 8.66 10*3/MM3 (ref 3.4–10.8)

## 2020-03-26 PROCEDURE — 36415 COLL VENOUS BLD VENIPUNCTURE: CPT

## 2020-03-26 PROCEDURE — 99244 OFF/OP CNSLTJ NEW/EST MOD 40: CPT | Performed by: INTERNAL MEDICINE

## 2020-03-26 PROCEDURE — 85025 COMPLETE CBC W/AUTO DIFF WBC: CPT

## 2020-03-26 RX ORDER — RALOXIFENE HYDROCHLORIDE 60 MG/1
60 TABLET, FILM COATED ORAL DAILY
Qty: 60 TABLET | Refills: 3 | Status: SHIPPED | OUTPATIENT
Start: 2020-03-26 | End: 2020-09-15 | Stop reason: SDUPTHER

## 2020-03-26 NOTE — PROGRESS NOTES
"    .     REASON FOR CONSULTATION: Right breast DCIS  Provide an opinion on any further workup or treatment                             REQUESTING PHYSICIAN: Jerome Hodges MD    RECORDS OBTAINED:  Records of the patients history including those obtained from the referring provider were reviewed and summarized in detail.    HISTORY OF PRESENT ILLNESS:  The patient is a 64 y.o. year old female who is here for an initial visit with the above-mentioned history.  The patient has prior history of epilepsy with her last seizure in , previously treated with Dilantin and changed to Keppra a few years ago.  The patient reports osteopenia which is mild by her last DEXA scan in 2016.  She has a history of osteoarthritis and has had a number of knee surgeries.    The patient reports menarche at age 15, spontaneous menopause at age 45-51.  Uterus is intact.  She is A1.  She received progesterone to \"stay pregnant\" in the past, no other hormonal treatment.    Family history is significant for a mother with breast cancer (DCIS) at age 79, father with prostate cancer in his 70s, sister with breast cancer at age 64 (patient in our practice), and a paternal uncle with glioblastoma at age 84, maternal grandfather with colon cancer at age 74, maternal first cousin with an abdominal sarcoma at age 25-30.    The patient has undergone routine previous mammograms, reports that she has had frequent six-month follow-up studies performed due to inability to accurately assess her breasts because of increased density.  She has never had a prior breast biopsy.  Mammogram on 2020 showed new calcifications right breast measuring 6 mm.  By ultrasound there was a hypoechoic area in the 8 o'clock position.  Patient underwent biopsy of 2020 of the right breast with finding of DCIS, high-grade with comedonecrosis, associated calcifications, solid, cribriform, and comedo growth patterns, largest area measuring 6 mm, ER negative, " ND negative.  Biopsy performed at [facility] and reviewed at [facility] with agreement.  The patient did undergo genetic testing with [facility] panel test negative 2/6/2020.  Patient underwent breast MRI 2/13/2020 with finding of a 4.4 cm abnormality in the 8 o'clock position of the right breast with clip in place from prior biopsy.  Given the size of the lesion, patient was felt not to be a candidate for lumpectomy.  She underwent right mastectomy with sentinel lymph node biopsy and tissue expander placement on 3/19/2020 with Dr. Hodges and Dr Gore.  Pathology showed high-grade DCIS, solid and cribriform with associated comedonecrosis.  Margins were negative.  The area of involvement with DCIS measured at least 4 cm.  [place] lymph nodes negative x3.    The patient is here today to discuss further management following surgery.  She is continuing to undergo tissue expansion with Dr. Gore.  He has placed her in a right upper extremity sling for now.  She has not experienced any postoperative complications.  She has minimal postoperative pain.        Past Medical History:   Diagnosis Date   • Arthritis     knees   • Breast cancer (CMS/HCC) 01/2020    Right DCIS   • Epilepsy (CMS/HCC)     last seizure 1990   • Floaters in visual field, bilateral    • H/O Gestational diabetes    • History of low back pain    • History of seizures    • History of vitamin D deficiency    • PONV (postoperative nausea and vomiting)    • Sciatic neuralgia      Past Surgical History:   Procedure Laterality Date   • BREAST BIOPSY Right 01/2020    malignant   • BREAST TISSUE EXPANDER INSERTION Right 3/19/2020    Procedure: RIGHT PLACEMENT OF TISSUE EXPANDER AND ALLODERM;  Surgeon: ABBIE Gore MD;  Location: [facility];  Service: Plastics;  Laterality: Right;   • COLONOSCOPY  2012    Normal   • COLONOSCOPY  2017   • GUM SURGERY      2000   • KNEE SURGERY      Both knees   • KNEE SURGERY Bilateral     meniscus   • MASTECTOMY WITH SENTINEL  NODE BIOPSY AND AXILLARY NODE DISSECTION Right 3/19/2020    Procedure: BREAST MASTECTOMY WITH SENTINEL NODE BIOPSY;  Surgeon: Jerome Hodges MD;  Location: [facility];  Service: General;  Laterality: Right;         HEMATOLOGIC/ONCOLOGIC HISTORY:  (History from previous dates can be found in the separate document.)    MEDICATIONS    Current Outpatient Medications:   •  Acetaminophen (TYLENOL PO), Take  by mouth., Disp: , Rfl:   •  B COMPLEX VITAMINS ER PO, Take 1 tablet by mouth Every Morning., Disp: , Rfl:   •  Cholecalciferol (VITAMIN D-1000 MAX ST) 25 MCG (1000 UT) tablet, Take 1,000 Units by mouth Every Morning., Disp: , Rfl:   •  cyclobenzaprine (FLEXERIL) 5 MG tablet, Take 1 tablet by mouth Every 8 (Eight)-12 (Twelve) Hours As Needed for pain. Take with Food., Disp: 40 tablet, Rfl: 0  •  doxycycline (ADOXA) 100 MG tablet, Take 1 tablet by mouth 2 (Two) Times a Day., Disp: 30 tablet, Rfl: 0  •  levETIRAcetam (KEPPRA) 500 MG tablet, Take 750 mg by mouth 2 (Two) Times a Day., Disp: , Rfl:   •  Multiple Vitamins-Minerals (ICAPS) tablet, Take  by mouth. Stopped 3/5/2020, Disp: , Rfl:   •  Multiple Vitamins-Minerals (PRESERVISION AREDS 2 PO), Take 1 tablet by mouth 2 (Two) Times a Day. Stopped 3/5/2020, Disp: , Rfl:   •  mupirocin (BACTROBAN) 2 % ointment, Apply to drain exit sites with 2 x 2 gauze daily, Disp: 22 g, Rfl: 1  •  ondansetron (ZOFRAN) 4 MG tablet, Take 1 tablet by mouth Every 6 (Six) Hours As Needed for nausea and vomiting., Disp: 20 tablet, Rfl: 0  •  oxyCODONE-acetaminophen (PERCOCET)  MG per tablet, Take 0.5-1 tablets by mouth Every 4 (Four)-6 (Six) Hours As Needed for pain. Take with food. Use as little as possible., Disp: 30 tablet, Rfl: 0  •  Probiotic Product (PROBIOTIC PO), Take  by mouth., Disp: , Rfl:   •  triamcinolone (KENALOG) 0.1 % ointment, Apply 1 application topically to the appropriate area as directed As Needed (toe fungus)., Disp: , Rfl:   •  raloxifene (EVISTA) 60  "MG tablet, Take 1 tablet by mouth Daily., Disp: 60 tablet, Rfl: 3    ALLERGIES:     Allergies   Allergen Reactions   • Atropine Other (See Comments) and Unknown - High Severity     seizure   • Dramamine [Dimenhydrinate] Dizziness   • Penicillins Rash       SOCIAL HISTORY:       Social History     Socioeconomic History   • Marital status:      Spouse name: Nikita   • Number of children: 2   • Years of education: Not on file   • Highest education level: Not on file   Occupational History     Employer: [employer]   Tobacco Use   • Smoking status: Never Smoker   • Smokeless tobacco: Never Used   Substance and Sexual Activity   • Alcohol use: Never     Frequency: Never   • Drug use: Never   • Sexual activity: Defer         FAMILY HISTORY:  Family History   Problem Relation Age of Onset   • Heart failure Mother    • Breast cancer Mother    • Arrhythmia Mother    • Dementia Mother    • Skin cancer Mother    • Heart failure Father    • Prostate cancer Father    • Colon polyps Father    • Skin cancer Father    • Breast cancer Sister    • No Known Problems Brother    • Dementia Maternal Grandmother    • Colon cancer Maternal Grandfather    • Diabetes Maternal Grandfather    • Stroke Maternal Grandfather    • Heart disease Paternal Grandmother    • Malig Hyperthermia Neg Hx        REVIEW OF SYSTEMS:  A comprehensive review of systems was performed and was negative except as mentioned above in the HPI.         Vitals:    03/26/20 1042   BP: 161/88   Pulse: 96   Resp: 16   Temp: 97.9 °F (36.6 °C)   SpO2: 100%   Weight: 70.6 kg (155 lb 9.6 oz)   Height: 169 cm (66.54\")   PainSc: 0-No pain     Current Status 3/26/2020   ECOG score 1       Physical Exam   Constitutional: She is oriented to person, place, and time. She appears well-developed and well-nourished.   HENT:   Mouth/Throat: Oropharynx is clear and moist.   Eyes: Conjunctivae are normal.   Neck: No thyromegaly present.   Cardiovascular: Normal rate and regular rhythm. " "Exam reveals no gallop and no friction rub.   No murmur heard.  Pulmonary/Chest: Breath sounds normal. No respiratory distress.   Breast exam was not performed today   Abdominal: Soft. Bowel sounds are normal. She exhibits no distension. There is no tenderness.   Musculoskeletal: She exhibits no edema.   Lymphadenopathy:        Head (right side): No submandibular adenopathy present.     She has no cervical adenopathy.     She has no axillary adenopathy.        Right: No inguinal and no supraclavicular adenopathy present.        Left: No inguinal and no supraclavicular adenopathy present.   Neurological: She is alert and oriented to person, place, and time. She displays normal reflexes. No cranial nerve deficit. She exhibits normal muscle tone.   Skin: Skin is warm and dry. No rash noted.   Psychiatric: She has a normal mood and affect. Her behavior is normal.       RECENT LABS:        WBC   Date Value Ref Range Status   2020 8.66 3.40 - 10.80 10*3/mm3 Final     Hemoglobin   Date Value Ref Range Status   2020 14.3 12.0 - 15.9 g/dL Final     Platelets   Date Value Ref Range Status   2020 256 140 - 450 10*3/mm3 Final       Assessment/Plan     1. Right breast DCIS ([id number]):  · Menarche at age 15, spontaneous menopause at age 45-50.  Uterus is intact.  A1.  She received progesterone to \"stay pregnant\" in the past, no other hormonal treatment.  · Family history is significant for a mother with breast cancer (DCIS) at age 79, father with prostate cancer in his 70s, sister with breast cancer at age 64 (patient in our practice), and a paternal uncle with glioblastoma at age 84, maternal grandfather with colon cancer at age 74, maternal first cousin with an abdominal sarcoma at age 25-30.  · The patient has undergone routine previous mammograms, reports that she has had frequent six-month follow-up studies performed due to inability to accurately assess her breasts because of increased density.  She " has never had a prior breast biopsy.    · Mammogram on 1/20/2020 showed new calcifications right breast measuring 6 mm.  By ultrasound there was a hypoechoic area in the 8 o'clock position.    · Biopsy 1/23/2020 of the right breast with finding of DCIS, high-grade with comedonecrosis, associated calcifications, solid, cribriform, and comedo growth patterns, largest area measuring 6 mm, ER negative, TN negative.  Biopsy performed at [facility] and reviewed at [facility] with agreement.    · The patient did undergo genetic testing with [facility] panel test negative 2/6/2020.    · Breast MRI 2/13/2020 with finding of a 4.4 cm abnormality in the 8 o'clock position of the right breast with clip in place from prior biopsy.    · Given the size of the lesion, patient was felt not to be a candidate for lumpectomy.    · She underwent right mastectomy with sentinel lymph node biopsy and tissue expander placement on 3/19/2020 with Dr. Hodges and Dr Gore.  Pathology showed high-grade DCIS, solid and cribriform with associated comedonecrosis.  Margins were negative.  The area of involvement with DCIS measured at least 4 cm.  [place] lymph nodes negative x3.  · The patient is here today to discuss further management following surgery.  She is continuing to undergo tissue expansion with Dr. Gore.  He has placed her in a right upper extremity sling for now.  She has not experienced any postoperative complications.  She has minimal postoperative pain.  I had a lengthy discussion with the patient today.  We reviewed her pathology.  We discussed the pre-invasive/in situ nature of her disease.  We discussed that there is not a risk for lymph node involvement or distant metastases in this situation.  She has received appropriate local therapy with mastectomy at this point.  We discussed use of endocrine therapy in the setting of DCIS with studies showing benefit in the setting of ER/TN positive disease in situations of lumpectomy to reduce  risk of ipsilateral recurrence.  In her situation she does have increased risk for a contralateral DCIS or breast cancer with her history of now DCIS and family history of breast cancer as outlined above despite her negative genetic assessment.  We discussed the role of chemoprevention.  Estimated that her risk of a contralateral DCIS or invasive cancer may be in the 6 to 10% range and that endocrine therapy for chemoprevention could reduce this by approximately 50%.  She is postmenopausal and therefore we would have the option to use tamoxifen, raloxifene, or aromatase inhibitor.  She does have mild underlying osteopenia and therefore we will not pursue aromatase inhibitor therapy.  She does have an intact uterus and in order to reduce risk of endometrial cancer and thrombosis, we discussed the use of raloxifene for 5 years.  The patient does wish to proceed.  Since she is currently only 1 week out from surgery, I have asked her to delay starting the medication until late April.  She is continuing on her tissue expansion.  At some point she will require implant placement and I have asked her to notify us of the date of the procedure and we may wish to hold raloxifene a few weeks before and a few weeks after the procedure due to thrombotic risk.  We also discussed mode of further surveillance.  Her recent DCIS was not well visualized on mammogram and she has had issues with breast density and difficulty with mammographic evaluation in the past.  Given her increased risk of breast cancer combined with her breast density, it is reasonable to consider annual mammogram and MRI follow-up for the remaining left breast.  2. Family history of colon cancer:  · Patient does have a family history of colon cancer in her maternal grandfather at age 74  · The patient does continue with routine colonoscopy, last performed in 2017 anticipating a 5-year interval follow-up.  3. Epilepsy:  · Patient does continue on Keppra, last  seizure in 1990    Plan:  1. Prescription sent for raloxifene 60 mg daily.  Patient instructed to begin the medication in late April (approximately 1 month out from surgery).  Anticipate 5 years treatment for chemoprevention.  2. The patient will continue with tissue expansion with plastic surgery.  When she is scheduled for her implant placement she will notify us and we will likely hold droloxifene 2 weeks before and 2 weeks after surgery to reduce thrombotic risk.  3. We will anticipate annual left breast mammogram and MRI evaluation, will be due in February/March 2021.  4. MD visit in 2 months with NELDA ORTEGA.    I did spend 60 minutes with the patient today, 50 minutes in face-to-face consultation and coordination of care reviewing issues outlined in the assessment and plan above.  Patient had multiple questions which I answered to her satisfaction.

## 2020-04-02 ENCOUNTER — OFFICE VISIT (OUTPATIENT)
Dept: MAMMOGRAPHY | Facility: CLINIC | Age: 65
End: 2020-04-02

## 2020-04-02 VITALS — DIASTOLIC BLOOD PRESSURE: 70 MMHG | SYSTOLIC BLOOD PRESSURE: 110 MMHG

## 2020-04-02 DIAGNOSIS — D05.11 DUCTAL CARCINOMA IN SITU (DCIS) OF RIGHT BREAST: Primary | ICD-10-CM

## 2020-04-02 PROCEDURE — 99024 POSTOP FOLLOW-UP VISIT: CPT | Performed by: SURGERY

## 2020-04-02 NOTE — PROGRESS NOTES
Chief Complaint: Honey Avila is a  64 y.o. female, initially referred by No ref. provider found , who is here today for a postoperative visit.    History of Present Illness:  In the interim,Honey Avila has had the following procedure and resultant pathology report: She is undergone a right mastectomy with sentinel lymph node biopsy.  3 sentinel lymph nodes were benign.  The mastectomy specimen revealed a 40 mm area of DCIS.  The margins are clear.  The patient is already seen the medical oncologist and she has been placed on hormone blocking therapy.  She is also following closely with Dr. Gore.    She has noted no redness, warmth,drainage, swelling at the incision site. Denies fever or chills.      Current Outpatient Medications:   •  Acetaminophen (TYLENOL PO), Take  by mouth., Disp: , Rfl:   •  B COMPLEX VITAMINS ER PO, Take 1 tablet by mouth Every Morning., Disp: , Rfl:   •  Cholecalciferol (VITAMIN D-1000 MAX ST) 25 MCG (1000 UT) tablet, Take 1,000 Units by mouth Every Morning., Disp: , Rfl:   •  cyclobenzaprine (FLEXERIL) 5 MG tablet, Take 1 tablet by mouth Every 8 (Eight)-12 (Twelve) Hours As Needed for pain. Take with Food., Disp: 40 tablet, Rfl: 0  •  doxycycline (ADOXA) 100 MG tablet, Take 1 tablet by mouth 2 (Two) Times a Day., Disp: 30 tablet, Rfl: 0  •  levETIRAcetam (KEPPRA) 500 MG tablet, Take 750 mg by mouth 2 (Two) Times a Day., Disp: , Rfl:   •  Multiple Vitamins-Minerals (ICAPS) tablet, Take  by mouth. Stopped 3/5/2020, Disp: , Rfl:   •  Multiple Vitamins-Minerals (PRESERVISION AREDS 2 PO), Take 1 tablet by mouth 2 (Two) Times a Day. Stopped 3/5/2020, Disp: , Rfl:   •  mupirocin (BACTROBAN) 2 % ointment, Apply to drain exit sites with 2 x 2 gauze daily, Disp: 22 g, Rfl: 1  •  ondansetron (ZOFRAN) 4 MG tablet, Take 1 tablet by mouth Every 6 (Six) Hours As Needed for nausea and vomiting., Disp: 20 tablet, Rfl: 0  •  oxyCODONE-acetaminophen (PERCOCET)  MG per tablet, Take  0.5-1 tablets by mouth Every 4 (Four)-6 (Six) Hours As Needed for pain. Take with food. Use as little as possible., Disp: 30 tablet, Rfl: 0  •  Probiotic Product (PROBIOTIC PO), Take  by mouth., Disp: , Rfl:   •  raloxifene (EVISTA) 60 MG tablet, Take 1 tablet by mouth Daily., Disp: 60 tablet, Rfl: 3  •  triamcinolone (KENALOG) 0.1 % ointment, Apply 1 application topically to the appropriate area as directed As Needed (toe fungus)., Disp: , Rfl:   Physical examination  Right breast- the incision itself looks fine with no signs of ischemia or infection.  She does have an expander in place.  Assessment:  DCIS right breast status post mastectomy- she appears to be recovering well and is following closely with the plastic surgeons.  I demonstrated the wall walking exercises to her today but emphasized that she should not start them until the plastic surgeons give her the green light.    Plan:  I will see her back in the office 2 weeks after she begins the wall walking exercises.          EMR Dragon/transcription disclaimer:    Much of this encounter note is an electronic transcription/translocation of spoken language to printed text.  The electronic translation of spoken language may permit erroneous, or at times, nonsensical words or phrases to be inadvertently transcribed.  Although I have reviewed the note from such areas, some may still exist.  Answers for HPI/ROS submitted by the patient on 3/31/2020   What is the primary reason for your visit?: Other  Please describe your symptoms.: Follow-up from masectomy  Have you had these symptoms before?: No  How long have you been having these symptoms?: Other

## 2020-04-28 ENCOUNTER — TELEPHONE (OUTPATIENT)
Dept: OTHER | Facility: HOSPITAL | Age: 65
End: 2020-04-28

## 2020-05-13 NOTE — TELEPHONE ENCOUNTER
Related to the COVID-19 precautions, Dr. Henderson will do a phone consult with the patient and re-requesition her test results with [facility]. The patient should not need to come in for a blood draw.

## 2020-05-19 ENCOUNTER — TELEPHONE (OUTPATIENT)
Dept: ONCOLOGY | Facility: CLINIC | Age: 65
End: 2020-05-19

## 2020-05-19 NOTE — TELEPHONE ENCOUNTER
PT CALLING ABOUT SE FROM [facility]. PT HAD BLURRY VISION 1 TIME AND IS NOW SCARED TO TAKE MED. SHE IS HOLDING IT AND WILL D/W MD AT HER APPT THIS THURS.

## 2020-05-21 ENCOUNTER — APPOINTMENT (OUTPATIENT)
Dept: LAB | Facility: HOSPITAL | Age: 65
End: 2020-05-21

## 2020-05-21 ENCOUNTER — OFFICE VISIT (OUTPATIENT)
Dept: ONCOLOGY | Facility: CLINIC | Age: 65
End: 2020-05-21

## 2020-05-21 DIAGNOSIS — D05.11 DUCTAL CARCINOMA IN SITU (DCIS) OF RIGHT BREAST: Primary | ICD-10-CM

## 2020-05-21 PROCEDURE — 99442 PR PHYS/QHP TELEPHONE EVALUATION 11-20 MIN: CPT | Performed by: INTERNAL MEDICINE

## 2020-05-21 NOTE — PROGRESS NOTES
Subjective .     REASONS FOR FOLLOWUP:    1. Right breast DCIS ([id number]):  · Mammogram on 1/20/2020 showed new calcifications right breast measuring 6 mm.  By ultrasound there was a hypoechoic area in the 8 o'clock position.    · Biopsy 1/23/2020 of the right breast with finding of DCIS, high-grade with comedonecrosis, associated calcifications, solid, cribriform, and comedo growth patterns, largest area measuring 6 mm, ER negative, CA negative.  Biopsy performed at [facility] and reviewed at [facility] with agreement.    · The patient did undergo genetic testing with INVITAE panel test negative 2/6/2020.    · Breast MRI 2/13/2020 with finding of a 4.4 cm abnormality in the 8 o'clock position of the right breast with clip in place from prior biopsy.    · Given the size of the lesion, patient was felt not to be a candidate for lumpectomy.    · Right mastectomy with sentinel lymph node biopsy and tissue expander placement on 3/19/2020 with Dr. Hodges and Dr Gore.  Pathology showed high-grade DCIS, solid and cribriform with associated comedonecrosis.  Margins were negative.  The area of involvement with DCIS measured at least 4 cm.  [place] lymph nodes negative x3.  · Given patient's family history and personal history of DCIS, estimated risk of contralateral DCIS or invasive cancer felt to be in the 6-10% range, recommended chemoprevention for risk reduction.  Patient with underlying osteopenia and intact uterus.  Decision to pursue raloxifene 60 mg daily x5 years, initiated 3/26/2020.  · Due to poor visualization of prior DCIS on mammogram as well as issues with breast density and difficulty with mammographic evaluation in the past, plan annual follow-up left mammogram and MRI.    HISTORY OF PRESENT ILLNESS:  The patient is a 64 y.o. year old female who is here for follow-up with the above-mentioned history.    History of Present Illness   the patient is evaluated today via telephone visit due to the current  COVID-19 pandemic.  She is being seen in follow-up from her initial consultation continuing since then on raloxifene 60 mg daily.  She has tolerated this quite well.  She denies any significant hot flashes.  Since her surgery she has remained off of aspirin plus for ocular vitamin.  She did note one episode of blurred vision while reading which was very brief.  There were no changes in her visual field.  She does note a slight rash around the area of sun exposure on her neck that is minimal.  Unclear whether this may be related to droloxifene.  She is continuing to undergo tissue expansion with plastic surgery and has not experienced any difficulties with this.  She otherwise is doing well.    Past Medical History:   Diagnosis Date   • Arthritis     knees   • Breast cancer (CMS/[facility]) 01/2020    Right DCIS   • Epilepsy (CMS/[facility])     last seizure 1990   • Floaters in visual field, bilateral    • H/O Gestational diabetes    • History of low back pain    • History of seizures    • History of vitamin D deficiency    • PONV (postoperative nausea and vomiting)    • Sciatic neuralgia      Past Surgical History:   Procedure Laterality Date   • BREAST BIOPSY Right 01/2020    malignant   • BREAST TISSUE EXPANDER INSERTION Right 3/19/2020    Procedure: RIGHT PLACEMENT OF TISSUE EXPANDER AND ALLODERM;  Surgeon: ABBIE Gore MD;  Location: [facility];  Service: Plastics;  Laterality: Right;   • COLONOSCOPY  2012    Normal   • COLONOSCOPY  2017   • GUM SURGERY      2000   • KNEE SURGERY      Both knees   • KNEE SURGERY Bilateral     meniscus   • MASTECTOMY WITH SENTINEL NODE BIOPSY AND AXILLARY NODE DISSECTION Right 3/19/2020    Procedure: BREAST MASTECTOMY WITH SENTINEL NODE BIOPSY;  Surgeon: Jerome Hodges MD;  Location: [facility];  Service: General;  Laterality: Right;         ONCOLOGIC HISTORY:  (History from previous dates can be found in the separate document.)    Current Outpatient Medications on File  Prior to Visit   Medication Sig Dispense Refill   • Acetaminophen (TYLENOL PO) Take  by mouth.     • B COMPLEX VITAMINS ER PO Take 1 tablet by mouth Every Morning.     • Cholecalciferol (VITAMIN D-1000 MAX ST) 25 MCG (1000 UT) tablet Take 1,000 Units by mouth Every Morning.     • cyclobenzaprine (FLEXERIL) 5 MG tablet Take 1 tablet by mouth Every 8 (Eight)-12 (Twelve) Hours As Needed for pain. Take with Food. 40 tablet 0   • doxycycline (ADOXA) 100 MG tablet Take 1 tablet by mouth 2 (Two) Times a Day. 30 tablet 0   • levETIRAcetam (KEPPRA) 500 MG tablet Take 750 mg by mouth 2 (Two) Times a Day.     • Multiple Vitamins-Minerals (ICAPS) tablet Take  by mouth. Stopped 3/5/2020     • Multiple Vitamins-Minerals (PRESERVISION AREDS 2 PO) Take 1 tablet by mouth 2 (Two) Times a Day. Stopped 3/5/2020     • mupirocin (BACTROBAN) 2 % ointment Apply to drain exit sites with 2 x 2 gauze daily 22 g 1   • ondansetron (ZOFRAN) 4 MG tablet Take 1 tablet by mouth Every 6 (Six) Hours As Needed for nausea and vomiting. 20 tablet 0   • oxyCODONE-acetaminophen (PERCOCET)  MG per tablet Take 0.5-1 tablets by mouth Every 4 (Four)-6 (Six) Hours As Needed for pain. Take with food. Use as little as possible. 30 tablet 0   • Probiotic Product (PROBIOTIC PO) Take  by mouth.     • raloxifene (EVISTA) 60 MG tablet Take 1 tablet by mouth Daily. 60 tablet 3   • triamcinolone (KENALOG) 0.1 % ointment Apply 1 application topically to the appropriate area as directed As Needed (toe fungus).       No current facility-administered medications on file prior to visit.        ALLERGIES:     Allergies   Allergen Reactions   • Atropine Other (See Comments) and Unknown - High Severity     seizure   • Dramamine [Dimenhydrinate] Dizziness   • Penicillins Rash       Social History     Socioeconomic History   • Marital status:      Spouse name: Nikita   • Number of children: 2   • Years of education: Not on file   • Highest education level: Not on file    Occupational History     Employer: [employer]   Tobacco Use   • Smoking status: Never Smoker   • Smokeless tobacco: Never Used   Substance and Sexual Activity   • Alcohol use: Never     Frequency: Never   • Drug use: Never   • Sexual activity: Defer     Family History   Problem Relation Age of Onset   • Heart failure Mother    • Breast cancer Mother    • Arrhythmia Mother    • Dementia Mother    • Skin cancer Mother    • Thyroid disease Mother    • Heart failure Father    • Prostate cancer Father    • Colon polyps Father    • Skin cancer Father    • Hypertension Father    • Heart disease Father    • Breast cancer Sister    • Thyroid disease Brother    • Dementia Maternal Grandmother    • Colon cancer Maternal Grandfather    • Diabetes Maternal Grandfather    • Stroke Maternal Grandfather    • Heart disease Paternal Grandmother    • Diabetes Maternal Uncle    • Malig Hyperthermia Neg Hx               Review of Systems   Constitutional: Negative for activity change, appetite change, fatigue, fever and unexpected weight change.   HENT: Negative for congestion, mouth sores, nosebleeds, sore throat and voice change.    Eyes: Positive for visual disturbance.   Respiratory: Negative for cough, shortness of breath and wheezing.    Cardiovascular: Negative for chest pain, palpitations and leg swelling.   Gastrointestinal: Negative for abdominal distention, abdominal pain, blood in stool, constipation, diarrhea, nausea and vomiting.   Endocrine: Negative for cold intolerance and heat intolerance.   Genitourinary: Negative for difficulty urinating, dysuria, frequency and hematuria.   Musculoskeletal: Negative for arthralgias, back pain, joint swelling and myalgias.   Skin: Positive for rash.   Neurological: Negative for dizziness, syncope, weakness, light-headedness, numbness and headaches.   Hematological: Negative for adenopathy. Does not bruise/bleed easily.   Psychiatric/Behavioral: Negative for confusion and sleep  "disturbance. The patient is not nervous/anxious.          Objective      There were no vitals filed for this visit.   Current Status 3/26/2020   ECOG score 1       Physical Exam   Constitutional: She is oriented to person, place, and time.   Neurological: She is alert and oriented to person, place, and time.   Psychiatric: She has a normal mood and affect. Her behavior is normal. Judgment and thought content normal.   The patient was evaluated via telephone visit today and therefore physical exam was not performed.    RECENT LABS:  Hematology WBC   Date Value Ref Range Status   2020 8.66 3.40 - 10.80 10*3/mm3 Final     RBC   Date Value Ref Range Status   2020 4.66 3.77 - 5.28 10*6/mm3 Final     Hemoglobin   Date Value Ref Range Status   2020 14.3 12.0 - 15.9 g/dL Final     Hematocrit   Date Value Ref Range Status   2020 41.3 34.0 - 46.6 % Final     Platelets   Date Value Ref Range Status   2020 256 140 - 450 10*3/mm3 Final        Assessment/Plan     1. Right breast DCIS ([id number]):  · Menarche at age 15, spontaneous menopause at age 45-50.  Uterus is intact.  A1.  She received progesterone to \"stay pregnant\" in the past, no other hormonal treatment.  · Family history is significant for a mother with breast cancer (DCIS) at age 79, father with prostate cancer in his 70s, sister with breast cancer at age 64 (patient in our practice), and a paternal uncle with glioblastoma at age 84, maternal grandfather with colon cancer at age 74, maternal first cousin with an abdominal sarcoma at age 25-30.  · The patient has undergone routine previous mammograms, reports that she has had frequent six-month follow-up studies performed due to inability to accurately assess her breasts because of increased density.  She has never had a prior breast biopsy.    · Mammogram on 2020 showed new calcifications right breast measuring 6 mm.  By ultrasound there was a hypoechoic area in the 8 o'clock " position.    · Biopsy 1/23/2020 of the right breast with finding of DCIS, high-grade with comedonecrosis, associated calcifications, solid, cribriform, and comedo growth patterns, largest area measuring 6 mm, ER negative, ID negative.  Biopsy performed at [facility] and reviewed at [facility] with agreement.    · The patient did undergo genetic testing with INVITAE panel test negative 2/6/2020.    · Breast MRI 2/13/2020 with finding of a 4.4 cm abnormality in the 8 o'clock position of the right breast with clip in place from prior biopsy.    · Given the size of the lesion, patient was felt not to be a candidate for lumpectomy.    · Right mastectomy with sentinel lymph node biopsy and tissue expander placement on 3/19/2020 with Dr. Hodges and Dr Gore.  Pathology showed high-grade DCIS, solid and cribriform with associated comedonecrosis.  Margins were negative.  The area of involvement with DCIS measured at least 4 cm.  [place] lymph nodes negative x3.  · Given patient's family history and personal history of DCIS, estimated risk of contralateral DCIS or invasive cancer felt to be in the 6-10% range, recommended chemoprevention for risk reduction.  Patient with underlying osteopenia and intact uterus.  Decision to pursue raloxifene 60 mg daily x5 years, initiated 3/26/2020.  · Due to poor visualization of prior DCIS on mammogram as well as issues with breast density and difficulty with mammographic evaluation in the past, plan annual follow-up left mammogram and MRI.  · The patient is evaluated today via telephone visit due to the current viral pandemic.  She has been continuing on raloxifene as chemoprevention.  She has tolerated this well.  She notes an episode of blurred vision that occurred recently and was very transient.  There was no other visual abnormality.  I am unsure whether this is at all related to raloxifene.  We did discuss the risk of retinal vein thrombosis with the drug however her symptoms are not  suspicious of this.  She also had experienced a mild rash around her neck with sun exposure recently and reports that this is improved.  Again, unclear whether this could be related to raloxifene.  I did ask her to use topical hydrocortisone and avoid sun exposure.  We did discuss follow-up visit in 3 months at which time she will undergo breast exam and we will check laboratory studies.  We did have discussion today again regarding mode for radiographic follow-up of her left breast with mammogram and MRI.  She was inquiring about staggered studies 6 months apart.  For now however we will plan to perform both studies in January 2021.  The patient does continue with tissue expansion, may have implant placement in the fall.  We did discuss that I would recommend holding raloxifene perioperatively at least 1 week before and after surgery due to thrombotic risk.  2. Family history of colon cancer:  · Patient does have a family history of colon cancer in her maternal grandfather at age 74  · The patient does continue with routine colonoscopy, last performed in 2017 anticipating a 5-year interval follow-up.  3. Epilepsy:  · Patient does continue on Keppra, last seizure in 1990     Plan:  1. Continue chemoprevention with raloxifene 60 mg daily.   2. The patient will continue with tissue expansion anticipating future implant placement.  3. MD visit in 3 months with CBC, CMP.    The patient did consent to telephone visit today.  The patient had multiple questions today which I answered to her satisfaction.  I did spend 15 minutes on the telephone.

## 2020-06-01 ENCOUNTER — OFFICE VISIT (OUTPATIENT)
Dept: MAMMOGRAPHY | Facility: CLINIC | Age: 65
End: 2020-06-01

## 2020-06-01 VITALS — DIASTOLIC BLOOD PRESSURE: 70 MMHG | SYSTOLIC BLOOD PRESSURE: 122 MMHG

## 2020-06-01 DIAGNOSIS — D05.11 DUCTAL CARCINOMA IN SITU (DCIS) OF RIGHT BREAST: Primary | ICD-10-CM

## 2020-06-01 PROCEDURE — 99024 POSTOP FOLLOW-UP VISIT: CPT | Performed by: SURGERY

## 2020-06-01 NOTE — PROGRESS NOTES
Chief Complaint: Honey Avila is a  64 y.o. female, initially referred by No ref. provider found , who is here today for a postoperative visit.    History of Present Illness:  In the interim,Honey Avila has been following closely with Dr. Gore.  The expander is not quite fully filled but they are getting close.  She has been working and moving her arm.    She has noted no redness, warmth,drainage, swelling at the incision site. Denies fever or chills.      Current Outpatient Medications:   •  Acetaminophen (TYLENOL PO), Take  by mouth., Disp: , Rfl:   •  B COMPLEX VITAMINS ER PO, Take 1 tablet by mouth Every Morning., Disp: , Rfl:   •  Cholecalciferol (VITAMIN D-1000 MAX ST) 25 MCG (1000 UT) tablet, Take 1,000 Units by mouth Every Morning., Disp: , Rfl:   •  cyclobenzaprine (FLEXERIL) 5 MG tablet, Take 1 tablet by mouth Every 8 (Eight)-12 (Twelve) Hours As Needed for pain. Take with Food., Disp: 40 tablet, Rfl: 0  •  doxycycline (ADOXA) 100 MG tablet, Take 1 tablet by mouth 2 (Two) Times a Day., Disp: 30 tablet, Rfl: 0  •  levETIRAcetam (KEPPRA) 500 MG tablet, Take 750 mg by mouth 2 (Two) Times a Day., Disp: , Rfl:   •  Multiple Vitamins-Minerals (ICAPS) tablet, Take  by mouth. Stopped 3/5/2020, Disp: , Rfl:   •  Multiple Vitamins-Minerals (PRESERVISION AREDS 2 PO), Take 1 tablet by mouth 2 (Two) Times a Day. Stopped 3/5/2020, Disp: , Rfl:   •  mupirocin (BACTROBAN) 2 % ointment, Apply to drain exit sites with 2 x 2 gauze daily, Disp: 22 g, Rfl: 1  •  ondansetron (ZOFRAN) 4 MG tablet, Take 1 tablet by mouth Every 6 (Six) Hours As Needed for nausea and vomiting., Disp: 20 tablet, Rfl: 0  •  oxyCODONE-acetaminophen (PERCOCET)  MG per tablet, Take 0.5-1 tablets by mouth Every 4 (Four)-6 (Six) Hours As Needed for pain. Take with food. Use as little as possible., Disp: 30 tablet, Rfl: 0  •  Probiotic Product (PROBIOTIC PO), Take  by mouth., Disp: , Rfl:   •  raloxifene (EVISTA) 60 MG tablet, Take  1 tablet by mouth Daily., Disp: 60 tablet, Rfl: 3  •  triamcinolone (KENALOG) 0.1 % ointment, Apply 1 application topically to the appropriate area as directed As Needed (toe fungus)., Disp: , Rfl:   Physical examination  Right chest wall- status post mastectomy with expander reconstruction.  The incision itself looks fine with no signs of recurrence and I do not feel any problems beneath the skin flaps.  There is no evidence of lymphedema.  Right upper extremity- there is full range of motion.  Assessment:  DCIS right breast status post mastectomy.  She is following closely with Dr. Bella.  He will attempt to alternate mammogram and MRI.  She is on hormone blocking therapy and tolerating that well.  She will be seen in his office every 6 months.    Plan:  I will see her on an as-needed basis.          EMR Dragon/transcription disclaimer:    Much of this encounter note is an electronic transcription/translocation of spoken language to printed text.  The electronic translation of spoken language may permit erroneous, or at times, nonsensical words or phrases to be inadvertently transcribed.  Although I have reviewed the note from such areas, some may still exist.

## 2020-07-02 ENCOUNTER — DOCUMENTATION (OUTPATIENT)
Dept: OTHER | Facility: CLINIC | Age: 65
End: 2020-07-02

## 2020-07-02 PROBLEM — Z17.1 MALIGNANT NEOPLASM OF OVERLAPPING SITES OF RIGHT BREAST IN FEMALE, ESTROGEN RECEPTOR NEGATIVE (HCC): Status: ACTIVE | Noted: 2020-07-02

## 2020-07-02 PROBLEM — C50.811 MALIGNANT NEOPLASM OF OVERLAPPING SITES OF RIGHT BREAST IN FEMALE, ESTROGEN RECEPTOR NEGATIVE (HCC): Status: ACTIVE | Noted: 2020-07-02

## 2020-07-02 NOTE — PROGRESS NOTES
NAME:Honey Avila            YOB: 1955    MRN:[id number]    DATE SEEN:07/02/2020    COUNSELOR:    : José Luis Henderson M.D.      Honey Avila was seen for genetic counseling at the [facility].  Her referral was initiated by Dr. Jerome Hodges, Dr. Robert Gore and Dr. Phong Bella because of a diagnosis of breast cancer and family history of breast cancer raising the possibility of a hereditary basis for the malignancy..     Review of the patient's health history indicated Ms Avila had a history of epilepsy and adolescence and has not had a seizure since 1998.  She has osteopenia and she has had knee surgery.  She still has her ovaries.  A mammogram on January 20, 2020 revealed new calcifications in the right breast measuring 6 mm.  Ultrasound revealed a hypoechoic area in the 8 o'clock position.  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  The tumor was both estrogen and progesterone receptor negative.  A breast cancer stat panel at [facility] was performed and the results were negative for a pathogenic variant in the following 9 genes: PJ,BRCA1, BRCA2, CDH1,CHEK2, PALB2, PTEN, STK11, and TP53.  Breast MRI on February 13 revealed a 4.4 cm abnormality in the 8 o'clock position of the right breast.  She then underwent a right mastectomy with sentinel node lymph biopsy and tissue expander placement on March 19, 2020.  Pathology revealed high-grade ductal carcinoma in situ, solid and cribriform with associated comedonecrosis.  Three sentinel lymph nodes were negative.  She has been placed on Evista..    Review of the family history and pedigree revealed that Ms. Avila is 64 years of age.  She has a son 29 and daughter 28.  Her sister was diagnosed with  breast cancer at 64 and she is now 66.  Her mother was diagnosed with breast cancer at 79 and she is 94 and her father is also 94 and he was diagnosed with prostate cancer in his 70s.  Her maternal grandfather was diagnosed with colon cancer at 79 and her paternal uncle had both prostate cancer and a glioblastoma and he  at 84.  The rest of the family history was noncontributory and there is no Ashkenazi Holiness descent..    The rest of the genetic counseling session focused on the possibility of a hereditary basis for her breast cancer diagnosis.  She was told that approximately  70% of breast cancer is sporadic in nature, usually occurring later in life and without a significant family history of breast cancer.  In about 20% to 25% of cases, familial clustering occurs suggesting an increased genetic predisposition, although a specific abnormality within a gene is not detected.  In about 5% to 10% of cases, a hereditary basis represents the cause for the cancer and a single gene is found to have a pathogenic variant that significantly increases the risk for breast cancer, may increase the risk for a second breast cancer and other malignancies.  In addition, the pathogenic variant places first degree relatives at 50% risk for inheriting the hereditary condition and, if not inherited, the risk for cancer would be the same as the risk for that cancer in the general population.    The most common cause of hereditary breast cancer is hereditary breast and ovarian cancer syndrome.  About 50% of hereditary breast cancer either results from mutation in the BRCA1 gene (a tumor suppressor gene on chromosome 17) or the BRCA2 gene(a tumor suppressor gene on chromosome 13).  The presence of a mutation either of these genes increases the lifetime risk for breast cancer from 12% to 13% in the general population potentially to greater than 80%.  It also increases the risk for a second primary breast cancer to greater than 50%   There is also an increased risk for ovarian cancer. In the case of BRCA1, the ovarian cancer risk can be as high as 44% and in the case of BRCA2 as high as 27%, compared to the general population risk of approximately 1.6%.  An increased risk for prostate cancer, male breast cancer, melanoma and pancreatic cancer also exists,either with a BRCA1 or  BRCA2 pathogenic variant.  Characteristics of hereditary breast and ovarian cancer syndrome frequently include but are not limited to an early age of diagnosis of breast cancer, multiple breast cancer primaries, the presence of both breast and ovarian cancer in the same woman or ovarian cancer alone, male breast cancer, family history of breast and ovarian cancer, ovarian cancer or multiple cases of breast cancer,triple negative breast cancer and Ashkenazi Shinto descent.    Since the original discovery of the BRCA1 and BRCA2 genes in the mid 1990s several moderate risk genes have been identified that may increase the risk fro breast cancer in the range of 20% to as high as 60%.  These moderate breast risk genes in general are not associated with an increased risk for ovarian cancer but may be associated with an increased risk for other malignancies.    Based on Ms. Avila's diagnosis and family history of cancer, the possibility of a hereditary basis for the malignancies exists..  A blood sample will be sent today to [facility] to perform the remainder of the 84 gene multi cancer panel.  In addition, additional genes on the breast and gynecologic cancers panel colorectal cancer panel sarcoma panel nervous system/brain cancer panel and prostate cancer panel will be pursued.  Should the out-of-pocket cost to her not exceed $100 [facility] will proceed with genetic testing and results will be communicated to her in 2 to 3 weeks.  Should insurance coverage be denied the maximum out-of-pocket cost to proceed with genetic testing would be $250.  Results are reported either as  negative in which no pathogenic variant has been found, positive him which a pathogenic variant has been detected, and/or identification of a variant of uncertain significance, in which a change in the gene has been identified but currently data is insufficient to classified either is benign or pathogenic.  However in most instances eventually when a variant of uncertain significance is reclassified it is considered to be benign although this is not always the case and pathogenicity cannot be ruled out at this time.  In the interim period of time if Honey Avila has any additional questions I can be reached either at [phone] or [phone].      Total time of the encounter was 30 minutes and 30  minutes was spent counseling.      José Luis Henderson MD  07/02/2020  Clinical     Addendum: A multi gene panel of 120 genes were analyzed at [facility] which included their multi cancer panel, breast and gynecologic cancers panel, colorectal cancer panel, sarcoma panel, nervous system and brain cancer panel, and prostate cancer panel.  The genes analyzed are listed in the [facility] diagnostic testing results report.  No pathogenic sequence variant or deletion/duplication was detected.  However a variant of uncertain significance was found in 3 separate genes including POLD1(c.923C>T), TERT(c.3200C>A), and TSC2(c.2920G>A).  A pathogenic variant in a single POLD1 gene is associated with colonic adenomatous polyps and colon cancer as well as autosomal dominant MDPL syndrome (mandibular hypoplasia, deafness, prone to right features and lipodystrophy).  The TERT gene is associated with both autosomal recessive (pathogenic variant and both TERT genes) and autosomal dominant (pathogenic variant in a single TERT gene) dyskeratosis congenita, and autosomal dominant idiopathic pulmonary fibrosis.  A pathogenic variant in TSC2 results in tuberous sclerosis complex.  Although these variants of uncertain significance cannot  be classified at this time, in most instances eventually when reclassification does occur the variants of uncertain significance are considered to be benign although this is not always the case.  However, the greatest likelihood is that all 3 variants of uncertain significance are of no clinical importance, the pathogenicity cannot be ruled out at this time.  When reclassification does occur a separate report will be generated by [facility] and it will be sent to  and to [facility] and [facility].  If she has any questions in the interim period of time she can reach me at [phone] or [phone].

## 2020-07-17 ENCOUNTER — APPOINTMENT (OUTPATIENT)
Dept: PREADMISSION TESTING | Facility: HOSPITAL | Age: 65
End: 2020-07-17

## 2020-07-17 VITALS
HEIGHT: 66 IN | OXYGEN SATURATION: 99 % | DIASTOLIC BLOOD PRESSURE: 81 MMHG | RESPIRATION RATE: 16 BRPM | BODY MASS INDEX: 26.03 KG/M2 | WEIGHT: 162 LBS | TEMPERATURE: 98.3 F | SYSTOLIC BLOOD PRESSURE: 134 MMHG | HEART RATE: 79 BPM

## 2020-07-17 LAB
ANION GAP SERPL CALCULATED.3IONS-SCNC: 8 MMOL/L (ref 5–15)
BUN SERPL-MCNC: 12 MG/DL (ref 8–23)
BUN/CREAT SERPL: 14.3 (ref 7–25)
CALCIUM SPEC-SCNC: 9.2 MG/DL (ref 8.6–10.5)
CHLORIDE SERPL-SCNC: 104 MMOL/L (ref 98–107)
CO2 SERPL-SCNC: 26 MMOL/L (ref 22–29)
CREAT SERPL-MCNC: 0.84 MG/DL (ref 0.57–1)
DEPRECATED RDW RBC AUTO: 43.2 FL (ref 37–54)
ERYTHROCYTE [DISTWIDTH] IN BLOOD BY AUTOMATED COUNT: 13.3 % (ref 12.3–15.4)
GFR SERPL CREATININE-BSD FRML MDRD: 68 ML/MIN/1.73
GLUCOSE SERPL-MCNC: 87 MG/DL (ref 65–99)
HCT VFR BLD AUTO: 39.9 % (ref 34–46.6)
HGB BLD-MCNC: 13.6 G/DL (ref 12–15.9)
MCH RBC QN AUTO: 30.3 PG (ref 26.6–33)
MCHC RBC AUTO-ENTMCNC: 34.1 G/DL (ref 31.5–35.7)
MCV RBC AUTO: 88.9 FL (ref 79–97)
PLATELET # BLD AUTO: 258 10*3/MM3 (ref 140–450)
PMV BLD AUTO: 9.8 FL (ref 6–12)
POTASSIUM SERPL-SCNC: 4 MMOL/L (ref 3.5–5.2)
RBC # BLD AUTO: 4.49 10*6/MM3 (ref 3.77–5.28)
SODIUM SERPL-SCNC: 138 MMOL/L (ref 136–145)
WBC # BLD AUTO: 7.13 10*3/MM3 (ref 3.4–10.8)

## 2020-07-17 PROCEDURE — 80048 BASIC METABOLIC PNL TOTAL CA: CPT | Performed by: SURGERY

## 2020-07-17 PROCEDURE — C9803 HOPD COVID-19 SPEC COLLECT: HCPCS

## 2020-07-17 PROCEDURE — 36415 COLL VENOUS BLD VENIPUNCTURE: CPT

## 2020-07-17 PROCEDURE — U0004 COV-19 TEST NON-CDC HGH THRU: HCPCS | Performed by: NURSE PRACTITIONER

## 2020-07-17 PROCEDURE — 85027 COMPLETE CBC AUTOMATED: CPT | Performed by: SURGERY

## 2020-07-17 NOTE — DISCHARGE INSTRUCTIONS
CHLORHEXIDINE CLOTH INSTRUCTIONS  The morning of surgery follow these instructions using the Chlorhexidine cloths you've been given.  These steps reduce bacteria on the body.  Do not use the cloths near your eyes, ears mouth, genitalia or on open wounds.  Throw the cloths away after use but do not try to flush them down a toilet.      • Open and remove one cloth at a time from the package.    • Leave the cloth unfolded and begin the bathing.  • Massage the skin with the cloths using gentle pressure to remove bacteria.  Do not scrub harshly.   • Follow the steps below with one 2% CHG cloth per area (6 total cloths).  • One cloth for neck, shoulders and chest.  • One cloth for both arms, hands, fingers and underarms (do underarms last).  • One cloth for the abdomen followed by groin.  • One cloth for right leg and foot including between the toes.  • One cloth for left leg and foot including between the toes.  • The last cloth is to be used for the back of the neck, back and buttocks.    Allow the CHG to air dry 3 minutes on the skin which will give it time to work and decrease the chance of irritation.  The skin may feel sticky until it is dry.  Do not rinse with water or any other liquid or you will lose the beneficial effects of the CHG.  If mild skin irritation occurs, do rinse the skin to remove the CHG.  Report this to the nurse at time of admission.  Do not apply lotions, creams, ointments, deodorants or perfumes after using the clothes. Dress in clean clothes before coming to the hospital.    Take the following medications the morning of surgery: [facility]    ARRIVAL TIME 10:30        General Instructions:  • Do not eat solid food after midnight the night before surgery.  • You may drink clear liquids day of surgery but must stop at least one hour before your hospital arrival time.  • It is beneficial for you to have a clear drink that contains carbohydrates the day of surgery.  We suggest a 12 to 20 ounce bottle  of Gatorade or Powerade for non-diabetic patients or a 12 to 20 ounce bottle of G2 or Powerade Zero for diabetic patients. (Pediatric patients, are not advised to drink a 12 to 20 ounce carbohydrate drink)    Clear liquids are liquids you can see through.  Nothing red in color.     Plain water                               Sports drinks  Sodas                                   Gelatin (Jell-O)  Fruit juices without pulp such as white grape juice and apple juice  Popsicles that contain no fruit or yogurt  Tea or coffee (no cream or milk added)  Gatorade / Powerade  G2 / Powerade Zero      • Patients who avoid smoking, chewing tobacco and alcohol for 4 weeks prior to surgery have a reduced risk of post-operative complications.  Quit smoking as many days before surgery as you can.  • Do not smoke, use chewing tobacco or drink alcohol the day of surgery.   • If applicable bring your C-PAP/ BI-PAP machine.  • Bring any papers given to you in the doctor’s office.  • Wear clean comfortable clothes.  • Do not wear contact lenses, false eyelashes or make-up.  Bring a case for your glasses.   • Bring crutches or walker if applicable.  • Remove all piercings.  Leave jewelry and any other valuables at home.  • Hair extensions with metal clips must be removed prior to surgery.  • The Pre-Admission Testing nurse will instruct you to bring medications if unable to obtain an accurate list in Pre-Admission Testing.            Preventing a Surgical Site Infection:  • For 2 to 3 days before surgery, avoid shaving with a razor because the razor can irritate skin and make it easier to develop an infection.    • Any areas of open skin can increase the risk of a post-operative wound infection by allowing bacteria to enter and travel throughout the body.  Notify your surgeon if you have any skin wounds / rashes even if it is not near the expected surgical site.  The area will need assessed to determine if surgery should be delayed until it  is healed.  • The night prior to surgery shower using a fresh bar of anti-bacterial soap (such as Dial) and clean washcloth.  Sleep in a clean bed with clean clothing.  Do not allow pets to sleep with you.  • Shower on the morning of surgery using a fresh bar of anti-bacterial soap (such as Dial) and clean washcloth.  Dry with a clean towel and dress in clean clothing.  • Ask your surgeon if you will be receiving antibiotics prior to surgery.  • Make sure you, your family, and all healthcare providers clean their hands with soap and water or an alcohol based hand  before caring for you or your wound.    Day of surgery:  Your arrival time is approximately two hours before your scheduled surgery time.  Upon arrival, a Pre-op nurse and Anesthesiologist will review your health history, obtain vital signs, and answer questions you may have.  The only belongings needed at this time will be a list of your home medications and if applicable your C-PAP/BI-PAP machine.  If you are staying overnight your family can leave the rest of your belongings in the car and bring them to your room later.  A Pre-op nurse will start an IV and you may receive medication in preparation for surgery, including something to help you relax.  Your family will be able to see you in the Pre-op area.  Two visitors at a time will be allowed in the Pre-op room.  While you are in surgery your family should notify the waiting room  if they leave the waiting room area and provide a contact phone number.    Please be aware that surgery does come with discomfort.  We want to make every effort to control your discomfort so please discuss any uncontrolled symptoms with your nurse.   Your doctor will most likely have prescribed pain medications.      If you are going home after surgery you will receive individualized written care instructions before being discharged.  A responsible adult must drive you to and from the hospital on the day  of your surgery and stay with you for 24 hours.    If you are staying overnight following surgery, you will be transported to your hospital room following the recovery period.  [facility] has all private rooms.    If you have any questions please call Pre-Admission Testing at [phone].  Deductibles and co-payments are collected on the day of service. Please be prepared to pay the required co-pay, deductible or deposit on the day of service as defined by your plan.    Patient Education for Self-Quarantine Process    Following your COVID testing, we strongly recommend that you do not leave your home after you have been tested for COVID except to get medical care. This includes not going to work, school or to public areas.  If this is not possible for you to do please limit your activities to only required outings.  Be sure to wear a mask when you are with other people, practice social distancing and wash your hands frequently.      The following items provide additional details to keep you safe.  • Wash your hands with soap and water frequently for at least 20 seconds.   • Avoid touching your eyes, nose and mouth with unwashed hands.  • Do not share anything - utensils, towels, food from the same bowl.   • Have your own utensils, drinking glass, dishes, towels and bedding.   • Do not have visitors.   • Do use FaceTime to stay in touch with family and friends.  • You should stay in a specific room away from others if possible.   • Stay at least 6 feet away from others in the home if you cannot have a dedicated room to yourself.   • Do not snuggle with your pet. While the CDC says there is no evidence that pets can spread COVID-19 or be infected from humans, it is probably best to avoid “petting, snuggling, being kissed or licked and sharing food (during self-quarantine)”, according to the CDC.   • Sanitize household surfaces daily. Include all high touch areas (door handles, light switches, phones,  countertops, etc.)  • Do not share a bathroom with others, if possible.   • Wear a mask around others in your home if you are unable to stay in a separate room or 6 feet apart. If  you are unable to wear a mask, have your family member wear a mask if they must be within 6 feet of you.   Call your surgeon immediately if you experience any of the following symptoms:  • Sore Throat  • Shortness of Breath or difficulty breathing  • Cough  • Chills  • Body soreness or muscle pain  • Headache  • Fever  • New loss of taste or smell  • Do not arrive for your surgery ill.  Your procedure will need to be rescheduled to another time.  You will need to call your physician before the day of surgery to avoid any unnecessary exposure to hospital staff as well as other patients.

## 2020-07-18 LAB
REF LAB TEST METHOD: NORMAL
SARS-COV-2 RNA RESP QL NAA+PROBE: NOT DETECTED

## 2020-07-20 ENCOUNTER — ANESTHESIA (OUTPATIENT)
Dept: PERIOP | Facility: HOSPITAL | Age: 65
End: 2020-07-20

## 2020-07-20 ENCOUNTER — ANESTHESIA EVENT (OUTPATIENT)
Dept: PERIOP | Facility: HOSPITAL | Age: 65
End: 2020-07-20

## 2020-07-20 ENCOUNTER — HOSPITAL ENCOUNTER (OUTPATIENT)
Facility: HOSPITAL | Age: 65
Setting detail: HOSPITAL OUTPATIENT SURGERY
Discharge: HOME | End: 2020-07-20
Attending: SURGERY | Admitting: SURGERY

## 2020-07-20 VITALS
DIASTOLIC BLOOD PRESSURE: 82 MMHG | BODY MASS INDEX: 25.76 KG/M2 | RESPIRATION RATE: 16 BRPM | SYSTOLIC BLOOD PRESSURE: 124 MMHG | OXYGEN SATURATION: 99 % | TEMPERATURE: 98.2 F | HEIGHT: 66 IN | HEART RATE: 82 BPM | WEIGHT: 160.27 LBS

## 2020-07-20 DIAGNOSIS — N62 MACROMASTIA: ICD-10-CM

## 2020-07-20 PROCEDURE — 25010000002 FENTANYL CITRATE (PF) 100 MCG/2ML SOLUTION: Performed by: NURSE ANESTHETIST, CERTIFIED REGISTERED

## 2020-07-20 PROCEDURE — 25010000002 ONDANSETRON PER 1 MG: Performed by: NURSE ANESTHETIST, CERTIFIED REGISTERED

## 2020-07-20 PROCEDURE — 25010000002 PROPOFOL 10 MG/ML EMULSION: Performed by: NURSE ANESTHETIST, CERTIFIED REGISTERED

## 2020-07-20 PROCEDURE — 88305 TISSUE EXAM BY PATHOLOGIST: CPT | Performed by: SURGERY

## 2020-07-20 PROCEDURE — C1789 PROSTHESIS, BREAST, IMP: HCPCS | Performed by: SURGERY

## 2020-07-20 PROCEDURE — 25010000003 BUPIVACAINE LIPOSOME 1.3 % SUSPENSION: Performed by: SURGERY

## 2020-07-20 PROCEDURE — 25010000002 GENTAMICIN PER 80 MG: Performed by: SURGERY

## 2020-07-20 PROCEDURE — C9290 INJ, BUPIVACAINE LIPOSOME: HCPCS | Performed by: SURGERY

## 2020-07-20 PROCEDURE — 25010000002 ROPIVACAINE PER 1 MG: Performed by: SURGERY

## 2020-07-20 PROCEDURE — 25010000002 DEXAMETHASONE PER 1 MG: Performed by: SURGERY

## 2020-07-20 PROCEDURE — 25010000002 DEXAMETHASONE PER 1 MG: Performed by: NURSE ANESTHETIST, CERTIFIED REGISTERED

## 2020-07-20 DEVICE — SALINE BREAST IMPLANT WITH DIAPHRAGM VALVE, 500CC  SMOOTH ROUND SALINE
Type: IMPLANTABLE DEVICE | Site: BREAST | Status: FUNCTIONAL
Brand: MENTOR SMOOTH ROUND HIGH PROFILE

## 2020-07-20 RX ORDER — ACETAMINOPHEN 325 MG/1
650 TABLET ORAL ONCE AS NEEDED
Status: DISCONTINUED | OUTPATIENT
Start: 2020-07-20 | End: 2020-07-20 | Stop reason: HOSPADM

## 2020-07-20 RX ORDER — SODIUM CHLORIDE, SODIUM LACTATE, POTASSIUM CHLORIDE, CALCIUM CHLORIDE 600; 310; 30; 20 MG/100ML; MG/100ML; MG/100ML; MG/100ML
9 INJECTION, SOLUTION INTRAVENOUS CONTINUOUS
Status: DISCONTINUED | OUTPATIENT
Start: 2020-07-20 | End: 2020-07-20 | Stop reason: HOSPADM

## 2020-07-20 RX ORDER — ACETAMINOPHEN 325 MG/1
975 TABLET ORAL ONCE
Status: COMPLETED | OUTPATIENT
Start: 2020-07-20 | End: 2020-07-20

## 2020-07-20 RX ORDER — LIDOCAINE HYDROCHLORIDE 10 MG/ML
0.5 INJECTION, SOLUTION EPIDURAL; INFILTRATION; INTRACAUDAL; PERINEURAL ONCE AS NEEDED
Status: DISCONTINUED | OUTPATIENT
Start: 2020-07-20 | End: 2020-07-20 | Stop reason: HOSPADM

## 2020-07-20 RX ORDER — NALOXONE HCL 0.4 MG/ML
0.2 VIAL (ML) INJECTION AS NEEDED
Status: DISCONTINUED | OUTPATIENT
Start: 2020-07-20 | End: 2020-07-20 | Stop reason: HOSPADM

## 2020-07-20 RX ORDER — HYDROMORPHONE HYDROCHLORIDE 1 MG/ML
0.25 INJECTION, SOLUTION INTRAMUSCULAR; INTRAVENOUS; SUBCUTANEOUS
Status: DISCONTINUED | OUTPATIENT
Start: 2020-07-20 | End: 2020-07-20 | Stop reason: HOSPADM

## 2020-07-20 RX ORDER — HYDROXYZINE PAMOATE 50 MG/1
50 CAPSULE ORAL ONCE
Status: COMPLETED | OUTPATIENT
Start: 2020-07-20 | End: 2020-07-20

## 2020-07-20 RX ORDER — SODIUM CHLORIDE 0.9 % (FLUSH) 0.9 %
3-10 SYRINGE (ML) INJECTION AS NEEDED
Status: DISCONTINUED | OUTPATIENT
Start: 2020-07-20 | End: 2020-07-20 | Stop reason: HOSPADM

## 2020-07-20 RX ORDER — DEXAMETHASONE SODIUM PHOSPHATE 10 MG/ML
INJECTION INTRAMUSCULAR; INTRAVENOUS AS NEEDED
Status: DISCONTINUED | OUTPATIENT
Start: 2020-07-20 | End: 2020-07-20 | Stop reason: SURG

## 2020-07-20 RX ORDER — PROMETHAZINE HYDROCHLORIDE 25 MG/ML
12.5 INJECTION, SOLUTION INTRAMUSCULAR; INTRAVENOUS ONCE AS NEEDED
Status: DISCONTINUED | OUTPATIENT
Start: 2020-07-20 | End: 2020-07-20 | Stop reason: HOSPADM

## 2020-07-20 RX ORDER — OXYCODONE AND ACETAMINOPHEN 7.5; 325 MG/1; MG/1
1 TABLET ORAL ONCE AS NEEDED
Status: DISCONTINUED | OUTPATIENT
Start: 2020-07-20 | End: 2020-07-20 | Stop reason: HOSPADM

## 2020-07-20 RX ORDER — SODIUM CHLORIDE 0.9 % (FLUSH) 0.9 %
3 SYRINGE (ML) INJECTION EVERY 12 HOURS SCHEDULED
Status: DISCONTINUED | OUTPATIENT
Start: 2020-07-20 | End: 2020-07-20 | Stop reason: HOSPADM

## 2020-07-20 RX ORDER — DOCUSATE SODIUM 100 MG/1
100 CAPSULE, LIQUID FILLED ORAL ONCE
Status: COMPLETED | OUTPATIENT
Start: 2020-07-20 | End: 2020-07-20

## 2020-07-20 RX ORDER — SODIUM CHLORIDE 9 MG/ML
INJECTION, SOLUTION INTRAVENOUS AS NEEDED
Status: DISCONTINUED | OUTPATIENT
Start: 2020-07-20 | End: 2020-07-20 | Stop reason: HOSPADM

## 2020-07-20 RX ORDER — MIDAZOLAM HYDROCHLORIDE 1 MG/ML
0.5 INJECTION INTRAMUSCULAR; INTRAVENOUS
Status: DISCONTINUED | OUTPATIENT
Start: 2020-07-20 | End: 2020-07-20 | Stop reason: HOSPADM

## 2020-07-20 RX ORDER — MEPERIDINE HYDROCHLORIDE 25 MG/ML
12.5 INJECTION INTRAMUSCULAR; INTRAVENOUS; SUBCUTANEOUS
Status: DISCONTINUED | OUTPATIENT
Start: 2020-07-20 | End: 2020-07-20 | Stop reason: HOSPADM

## 2020-07-20 RX ORDER — LIDOCAINE HYDROCHLORIDE 20 MG/ML
INJECTION, SOLUTION INFILTRATION; PERINEURAL AS NEEDED
Status: DISCONTINUED | OUTPATIENT
Start: 2020-07-20 | End: 2020-07-20 | Stop reason: SURG

## 2020-07-20 RX ORDER — PROMETHAZINE HYDROCHLORIDE 25 MG/1
25 SUPPOSITORY RECTAL ONCE AS NEEDED
Status: DISCONTINUED | OUTPATIENT
Start: 2020-07-20 | End: 2020-07-20 | Stop reason: HOSPADM

## 2020-07-20 RX ORDER — ROCURONIUM BROMIDE 10 MG/ML
INJECTION, SOLUTION INTRAVENOUS AS NEEDED
Status: DISCONTINUED | OUTPATIENT
Start: 2020-07-20 | End: 2020-07-20 | Stop reason: SURG

## 2020-07-20 RX ORDER — DEXAMETHASONE SODIUM PHOSPHATE 4 MG/ML
8 INJECTION, SOLUTION INTRA-ARTICULAR; INTRALESIONAL; INTRAMUSCULAR; INTRAVENOUS; SOFT TISSUE ONCE
Status: COMPLETED | OUTPATIENT
Start: 2020-07-20 | End: 2020-07-20

## 2020-07-20 RX ORDER — FENTANYL CITRATE 50 UG/ML
50 INJECTION, SOLUTION INTRAMUSCULAR; INTRAVENOUS
Status: DISCONTINUED | OUTPATIENT
Start: 2020-07-20 | End: 2020-07-20 | Stop reason: HOSPADM

## 2020-07-20 RX ORDER — HYDRALAZINE HYDROCHLORIDE 20 MG/ML
5 INJECTION INTRAMUSCULAR; INTRAVENOUS
Status: DISCONTINUED | OUTPATIENT
Start: 2020-07-20 | End: 2020-07-20 | Stop reason: HOSPADM

## 2020-07-20 RX ORDER — FENTANYL CITRATE 50 UG/ML
INJECTION, SOLUTION INTRAMUSCULAR; INTRAVENOUS AS NEEDED
Status: DISCONTINUED | OUTPATIENT
Start: 2020-07-20 | End: 2020-07-20 | Stop reason: SURG

## 2020-07-20 RX ORDER — PROMETHAZINE HYDROCHLORIDE 25 MG/ML
6.25 INJECTION, SOLUTION INTRAMUSCULAR; INTRAVENOUS
Status: DISCONTINUED | OUTPATIENT
Start: 2020-07-20 | End: 2020-07-20 | Stop reason: HOSPADM

## 2020-07-20 RX ORDER — CELECOXIB 200 MG/1
400 CAPSULE ORAL ONCE
Status: COMPLETED | OUTPATIENT
Start: 2020-07-20 | End: 2020-07-20

## 2020-07-20 RX ORDER — FAMOTIDINE 10 MG/ML
20 INJECTION, SOLUTION INTRAVENOUS ONCE
Status: COMPLETED | OUTPATIENT
Start: 2020-07-20 | End: 2020-07-20

## 2020-07-20 RX ORDER — PROPOFOL 10 MG/ML
VIAL (ML) INTRAVENOUS AS NEEDED
Status: DISCONTINUED | OUTPATIENT
Start: 2020-07-20 | End: 2020-07-20 | Stop reason: SURG

## 2020-07-20 RX ORDER — DIPHENHYDRAMINE HYDROCHLORIDE 50 MG/ML
12.5 INJECTION INTRAMUSCULAR; INTRAVENOUS
Status: DISCONTINUED | OUTPATIENT
Start: 2020-07-20 | End: 2020-07-20 | Stop reason: HOSPADM

## 2020-07-20 RX ORDER — DIPHENHYDRAMINE HCL 25 MG
25 CAPSULE ORAL
Status: DISCONTINUED | OUTPATIENT
Start: 2020-07-20 | End: 2020-07-20 | Stop reason: HOSPADM

## 2020-07-20 RX ORDER — HYDROMORPHONE HYDROCHLORIDE 1 MG/ML
0.5 INJECTION, SOLUTION INTRAMUSCULAR; INTRAVENOUS; SUBCUTANEOUS
Status: DISCONTINUED | OUTPATIENT
Start: 2020-07-20 | End: 2020-07-20 | Stop reason: HOSPADM

## 2020-07-20 RX ORDER — OXYCODONE HYDROCHLORIDE AND ACETAMINOPHEN 5; 325 MG/1; MG/1
1 TABLET ORAL ONCE AS NEEDED
Status: DISCONTINUED | OUTPATIENT
Start: 2020-07-20 | End: 2020-07-20 | Stop reason: HOSPADM

## 2020-07-20 RX ORDER — EPHEDRINE SULFATE 50 MG/ML
5 INJECTION, SOLUTION INTRAVENOUS ONCE AS NEEDED
Status: DISCONTINUED | OUTPATIENT
Start: 2020-07-20 | End: 2020-07-20 | Stop reason: HOSPADM

## 2020-07-20 RX ORDER — ONDANSETRON 2 MG/ML
4 INJECTION INTRAMUSCULAR; INTRAVENOUS ONCE AS NEEDED
Status: DISCONTINUED | OUTPATIENT
Start: 2020-07-20 | End: 2020-07-20 | Stop reason: HOSPADM

## 2020-07-20 RX ORDER — ONDANSETRON 4 MG/1
4 TABLET, FILM COATED ORAL ONCE AS NEEDED
Status: DISCONTINUED | OUTPATIENT
Start: 2020-07-20 | End: 2020-07-20 | Stop reason: HOSPADM

## 2020-07-20 RX ORDER — PROMETHAZINE HYDROCHLORIDE 25 MG/1
12.5 TABLET ORAL ONCE AS NEEDED
Status: DISCONTINUED | OUTPATIENT
Start: 2020-07-20 | End: 2020-07-20 | Stop reason: HOSPADM

## 2020-07-20 RX ORDER — FLUMAZENIL 0.1 MG/ML
0.2 INJECTION INTRAVENOUS AS NEEDED
Status: DISCONTINUED | OUTPATIENT
Start: 2020-07-20 | End: 2020-07-20 | Stop reason: HOSPADM

## 2020-07-20 RX ORDER — CYCLOBENZAPRINE HCL 10 MG
5 TABLET ORAL ONCE
Status: COMPLETED | OUTPATIENT
Start: 2020-07-20 | End: 2020-07-20

## 2020-07-20 RX ORDER — HYDROCODONE BITARTRATE AND ACETAMINOPHEN 7.5; 325 MG/1; MG/1
1 TABLET ORAL ONCE AS NEEDED
Status: DISCONTINUED | OUTPATIENT
Start: 2020-07-20 | End: 2020-07-20 | Stop reason: HOSPADM

## 2020-07-20 RX ORDER — PROMETHAZINE HYDROCHLORIDE 25 MG/1
25 TABLET ORAL ONCE AS NEEDED
Status: DISCONTINUED | OUTPATIENT
Start: 2020-07-20 | End: 2020-07-20 | Stop reason: HOSPADM

## 2020-07-20 RX ORDER — CLINDAMYCIN PHOSPHATE 900 MG/50ML
900 INJECTION INTRAVENOUS ONCE
Status: COMPLETED | OUTPATIENT
Start: 2020-07-20 | End: 2020-07-20

## 2020-07-20 RX ORDER — CELECOXIB 200 MG/1
200 CAPSULE ORAL ONCE
Status: COMPLETED | OUTPATIENT
Start: 2020-07-20 | End: 2020-07-20

## 2020-07-20 RX ORDER — ONDANSETRON 2 MG/ML
INJECTION INTRAMUSCULAR; INTRAVENOUS AS NEEDED
Status: DISCONTINUED | OUTPATIENT
Start: 2020-07-20 | End: 2020-07-20 | Stop reason: SURG

## 2020-07-20 RX ADMIN — FENTANYL CITRATE 100 MCG: 50 INJECTION INTRAMUSCULAR; INTRAVENOUS at 14:05

## 2020-07-20 RX ADMIN — DEXAMETHASONE SODIUM PHOSPHATE 8 MG: 4 INJECTION, SOLUTION INTRAMUSCULAR; INTRAVENOUS at 11:22

## 2020-07-20 RX ADMIN — SODIUM CHLORIDE, POTASSIUM CHLORIDE, SODIUM LACTATE AND CALCIUM CHLORIDE 9 ML/HR: 600; 310; 30; 20 INJECTION, SOLUTION INTRAVENOUS at 11:34

## 2020-07-20 RX ADMIN — DEXAMETHASONE SODIUM PHOSPHATE 6 MG: 10 INJECTION INTRAMUSCULAR; INTRAVENOUS at 14:23

## 2020-07-20 RX ADMIN — LIDOCAINE HYDROCHLORIDE 60 MG: 20 INJECTION, SOLUTION INFILTRATION; PERINEURAL at 14:07

## 2020-07-20 RX ADMIN — PROPOFOL 160 MG: 10 INJECTION, EMULSION INTRAVENOUS at 14:07

## 2020-07-20 RX ADMIN — ROCURONIUM BROMIDE 40 MG: 10 INJECTION INTRAVENOUS at 14:07

## 2020-07-20 RX ADMIN — CELECOXIB 400 MG: 200 CAPSULE ORAL at 11:21

## 2020-07-20 RX ADMIN — ONDANSETRON HYDROCHLORIDE 4 MG: 2 SOLUTION INTRAMUSCULAR; INTRAVENOUS at 14:23

## 2020-07-20 RX ADMIN — CLINDAMYCIN PHOSPHATE 900 MG: 18 INJECTION, SOLUTION INTRAMUSCULAR; INTRAVENOUS at 14:14

## 2020-07-20 RX ADMIN — DOCUSATE SODIUM 100 MG: 100 CAPSULE ORAL at 16:48

## 2020-07-20 RX ADMIN — ACETAMINOPHEN 975 MG: 325 TABLET, FILM COATED ORAL at 11:22

## 2020-07-20 RX ADMIN — SODIUM CHLORIDE, POTASSIUM CHLORIDE, SODIUM LACTATE AND CALCIUM CHLORIDE: 600; 310; 30; 20 INJECTION, SOLUTION INTRAVENOUS at 15:40

## 2020-07-20 RX ADMIN — CYCLOBENZAPRINE 5 MG: 10 TABLET, FILM COATED ORAL at 11:22

## 2020-07-20 RX ADMIN — HYDROXYZINE PAMOATE 50 MG: 50 CAPSULE ORAL at 11:22

## 2020-07-20 RX ADMIN — FAMOTIDINE 20 MG: 10 INJECTION INTRAVENOUS at 11:34

## 2020-07-20 RX ADMIN — CELECOXIB 200 MG: 200 CAPSULE ORAL at 16:48

## 2020-07-20 NOTE — ANESTHESIA PROCEDURE NOTES
Airway  Urgency: elective    Date/Time: 7/20/2020 2:11 PM  Airway not difficult    General Information and Staff    Patient location during procedure: OR  CRNA: Rossy Gallagher CRNA    Indications and Patient Condition  Indications for airway management: airway protection    Preoxygenated: yes  Mask difficulty assessment: 1 - vent by mask    Final Airway Details  Final airway type: endotracheal airway      Successful airway: ETT  Cuffed: yes   Successful intubation technique: direct laryngoscopy  Facilitating devices/methods: Bougie  Endotracheal tube insertion site: oral  Blade: Poppy  Blade size: 3  ETT size (mm): 7.0  Cormack-Lehane Classification: grade IIb - view of arytenoids or posterior of glottis only  Placement verified by: chest auscultation and capnometry   Cuff volume (mL): 5  Measured from: lips  ETT/EBT  to lips (cm): 20  Number of attempts at approach: 1  Assessment: lips, teeth, and gum same as pre-op and atraumatic intubation    Additional Comments  Smooth IV induction. Trachea intubated. Receding chin/ Cuff up. Ett secured. BEBS. Dentition intact without injury.

## 2020-07-20 NOTE — ANESTHESIA POSTPROCEDURE EVALUATION
"Patient: Honey Avila    Procedure Summary     Date:  07/20/20 Room / Location:  [facility] OR 06 / [facility] MAIN OR    Anesthesia Start:  1401 Anesthesia Stop:  1613    Procedure:  REMOVAL RIGHT TISSUE EXPANDER AND PLACEMENT OF IMPLANT. LEFT REDUCTION FOR SYMMETRY (Bilateral Breast) Diagnosis:      Surgeon:  ABBIE Gore MD Provider:  Claudy Brady MD    Anesthesia Type:  general ASA Status:  2          Anesthesia Type: general    Vitals  Vitals Value Taken Time   /79 7/20/2020  5:10 PM   Temp 36.8 °C (98.2 °F) 7/20/2020  5:10 PM   Pulse 83 7/20/2020  5:10 PM   Resp 16 7/20/2020  5:10 PM   SpO2 98 % 7/20/2020  5:10 PM           Post Anesthesia Care and Evaluation    Patient location during evaluation: PHASE II  Patient participation: complete - patient participated  Level of consciousness: awake  Pain management: adequate  Airway patency: patent  Anesthetic complications: No anesthetic complications    Cardiovascular status: acceptable  Respiratory status: acceptable  Hydration status: acceptable    Comments: /87   Pulse 87   Temp 36.8 °C (98.2 °F) (Oral)   Resp 16   Ht 167.6 cm (66\")   Wt 72.7 kg (160 lb 4.4 oz)   SpO2 99%   BMI 25.87 kg/m²       "

## 2020-07-20 NOTE — OP NOTE
Preoperative diagnosis acquired absence of right breast, history of right ductal carcinoma in situ, left breast macromastia with asymmetry  Postoperative diagnosis: Same  Procedure: Removal of right tissue expander, placement of [place] round smooth high profile saline implant 500 to 600 cc size filled to 525 cc right side subpectoral, left breast reduction for symmetry periareolar vertical lateral technique  Surgeon: Robert Gore MD  Anesthesia: General tracheal  Drains x2  Estimated blood loss 20 cc  Complications none apparent  Indications for procedure this nice patient's had a mastectomy and immediate tissue expander AlloDerm reconstruction on the right side she is undergone serial expansion is now ready for removal of her expander and placement of her final implant her left breast is larger than the expander and we plan a small reduction to a vertical lateral type reduction technique with periareolar elevation of the nipple areolar complex on a superior central type pedicle.  She is marked prior to surgery she understands risk benefits and complications as outlined in the informed consent from the [employer] Society of plastic surgeons regarding tissue expander removal and implant placement and reduction mammoplasty and COVID-19.  The patient has no signs or symptoms of COVID-19 she has been tested preoperatively and all precautions were followed pre-intra-and postoperatively.  She had no further questions prior to surgery and tested negative for COVID-19  Procedure: Patient's brought the operating room placed operative table supine position.  Satisfactory general endotracheal anesthesia is achieved without difficulty.  We injected dilute local anesthesia including Exparel for postoperative analgesia around the expander and the left breast.  She was prepped and draped in a sterile fashion.  We de-epithelialized around a 40 mm nipple areolar complex and de-epithelialized a superior central pedicle we then  made a small incision and the mastectomy incision on the right side elevated the mastectomy flap off of the muscle open the muscle in a muscle-splitting fashion using an 18-gauge needle we deflated the expander while the expanders being deflated.  We resected breast tissue from the vertical central aspect of our markings and then extending this out laterally specimen weighed 70.4 g.  We maintained excellent hemostasis with Bovie cauterization we then brought the incisions together on the reduction with temporary surgical staples changed gloves and turned our attention back to the right side were remove the expander.  We performed a capsular release superiorly a bit medially and superior laterally.  Excellent hemostasis was maintained with Bovie cauterization we placed 15 Romanian Guillermo drain through separate incision and secured it with a nylon suture.  We irrigated with antibiotic containing saline and 50% Betadine inspected again hemostasis was excellent then using a fresh pair powder free gloves we opened a 500 to 600 cc size smooth saline [place] high profile implant it was free from any gross defects all the air was evacuated it was bathed in our antibiotic containing saline and 50% Betadine we removed all of the air and using an introduction sleeve we carefully passed it into the pocket using a closed system technique filled the expander to 525 cc this gave the best overall projection and fill volume for the patient.  And the best match to her contralateral breast.  With her back elevated slightly.  We removed any remaining air from the implant remove the fill tubes fill tube plug was seated into position we closed the muscle layer with 2-0 Vicryl subcutaneous layer with 3-0 Vicryl 4-0 Vicryl in the deep dermis 5-0 PDS in subcuticular layer.  R. Guillermo drain was dressed with a Biopatch gauze and Tegaderm and Dermabond was placed over the incision on the right side and gauze and Tegaderm here we completed the  closure of the reduction with 3-0 Monocryl's and 4-0 Vicryls in the deep dermis and then 5-0 PDS to complete subcuticular closure nipple areolar complex was maintained on a superior central pedicle and easily advanced into its new location following the closure we coated the incision with Dermabond allowed this to dry and then gauze pads and Tegaderms were applied over these incisions as well.  We then applied ABD pads and 6 inch Ace wraps for compressive dressing.  She tolerated the procedure well and went to recovery in satisfactory condition.  Needle and sponge counts were correct x2.

## 2020-07-20 NOTE — ANESTHESIA PREPROCEDURE EVALUATION
Anesthesia Evaluation     Patient summary reviewed and Nursing notes reviewed   history of anesthetic complications: PONV  NPO Solid Status: > 8 hours  NPO Liquid Status: > 2 hours           Airway   Mallampati: II  TM distance: >3 FB  Neck ROM: full  No difficulty expected  Dental - normal exam     Pulmonary - negative pulmonary ROS and normal exam    breath sounds clear to auscultation  Cardiovascular - negative cardio ROS and normal exam    Rhythm: regular  Rate: normal        Neuro/Psych  (+) seizures well controlled,     GI/Hepatic/Renal/Endo - negative ROS   (-) diabetes    Musculoskeletal     Abdominal  - normal exam   Substance History - negative use     OB/GYN negative ob/gyn ROS         Other   arthritis,    history of cancer remission                    Anesthesia Plan    ASA 2     general   (Pt reports doing very well after last surgery, no N/V.  Had background propofol drip.  See previous anesthesia record)  intravenous induction     Anesthetic plan, all risks, benefits, and alternatives have been provided, discussed and informed consent has been obtained with: patient.

## 2020-07-22 LAB
CYTO UR: NORMAL
LAB AP CASE REPORT: NORMAL
PATH REPORT.FINAL DX SPEC: NORMAL
PATH REPORT.GROSS SPEC: NORMAL

## 2020-08-21 ENCOUNTER — TRANSCRIBE ORDERS (OUTPATIENT)
Dept: ADMINISTRATIVE | Facility: HOSPITAL | Age: 65
End: 2020-08-21

## 2020-08-21 ENCOUNTER — OFFICE VISIT (OUTPATIENT)
Dept: ONCOLOGY | Facility: CLINIC | Age: 65
End: 2020-08-21

## 2020-08-21 ENCOUNTER — LAB (OUTPATIENT)
Dept: LAB | Facility: HOSPITAL | Age: 65
End: 2020-08-21

## 2020-08-21 VITALS
BODY MASS INDEX: 25.38 KG/M2 | SYSTOLIC BLOOD PRESSURE: 129 MMHG | OXYGEN SATURATION: 99 % | DIASTOLIC BLOOD PRESSURE: 78 MMHG | HEART RATE: 86 BPM | RESPIRATION RATE: 20 BRPM | TEMPERATURE: 97.5 F | HEIGHT: 66 IN | WEIGHT: 157.9 LBS

## 2020-08-21 DIAGNOSIS — C50.811 MALIGNANT NEOPLASM OF OVERLAPPING SITES OF RIGHT BREAST IN FEMALE, ESTROGEN RECEPTOR NEGATIVE (HCC): ICD-10-CM

## 2020-08-21 DIAGNOSIS — Z17.1 MALIGNANT NEOPLASM OF OVERLAPPING SITES OF RIGHT BREAST IN FEMALE, ESTROGEN RECEPTOR NEGATIVE (HCC): ICD-10-CM

## 2020-08-21 DIAGNOSIS — D05.11 DUCTAL CARCINOMA IN SITU (DCIS) OF RIGHT BREAST: ICD-10-CM

## 2020-08-21 DIAGNOSIS — D05.11 DUCTAL CARCINOMA IN SITU (DCIS) OF RIGHT BREAST: Primary | ICD-10-CM

## 2020-08-21 LAB
ALBUMIN SERPL-MCNC: 4.5 G/DL (ref 3.5–5.2)
ALBUMIN/GLOB SERPL: 1.7 G/DL (ref 1.1–2.4)
ALP SERPL-CCNC: 61 U/L (ref 38–116)
ALT SERPL W P-5'-P-CCNC: 15 U/L (ref 0–33)
ANION GAP SERPL CALCULATED.3IONS-SCNC: 10.4 MMOL/L (ref 5–15)
AST SERPL-CCNC: 24 U/L (ref 0–32)
BASOPHILS # BLD AUTO: 0.03 10*3/MM3 (ref 0–0.2)
BASOPHILS NFR BLD AUTO: 0.4 % (ref 0–1.5)
BILIRUB SERPL-MCNC: 0.9 MG/DL (ref 0.2–1.2)
BUN SERPL-MCNC: 12 MG/DL (ref 6–20)
BUN/CREAT SERPL: 15.6 (ref 7.3–30)
CALCIUM SPEC-SCNC: 9.4 MG/DL (ref 8.5–10.2)
CHLORIDE SERPL-SCNC: 105 MMOL/L (ref 98–107)
CO2 SERPL-SCNC: 25.6 MMOL/L (ref 22–29)
CREAT SERPL-MCNC: 0.77 MG/DL (ref 0.6–1.1)
DEPRECATED RDW RBC AUTO: 45.1 FL (ref 37–54)
EOSINOPHIL # BLD AUTO: 0.11 10*3/MM3 (ref 0–0.4)
EOSINOPHIL NFR BLD AUTO: 1.6 % (ref 0.3–6.2)
ERYTHROCYTE [DISTWIDTH] IN BLOOD BY AUTOMATED COUNT: 13.9 % (ref 12.3–15.4)
GFR SERPL CREATININE-BSD FRML MDRD: 76 ML/MIN/1.73
GLOBULIN UR ELPH-MCNC: 2.6 GM/DL (ref 1.8–3.5)
GLUCOSE SERPL-MCNC: 117 MG/DL (ref 74–124)
HCT VFR BLD AUTO: 41.5 % (ref 34–46.6)
HGB BLD-MCNC: 13.9 G/DL (ref 12–15.9)
IMM GRANULOCYTES # BLD AUTO: 0.02 10*3/MM3 (ref 0–0.05)
IMM GRANULOCYTES NFR BLD AUTO: 0.3 % (ref 0–0.5)
LYMPHOCYTES # BLD AUTO: 2.18 10*3/MM3 (ref 0.7–3.1)
LYMPHOCYTES NFR BLD AUTO: 32.3 % (ref 19.6–45.3)
MCH RBC QN AUTO: 30 PG (ref 26.6–33)
MCHC RBC AUTO-ENTMCNC: 33.5 G/DL (ref 31.5–35.7)
MCV RBC AUTO: 89.6 FL (ref 79–97)
MONOCYTES # BLD AUTO: 0.61 10*3/MM3 (ref 0.1–0.9)
MONOCYTES NFR BLD AUTO: 9.1 % (ref 5–12)
NEUTROPHILS NFR BLD AUTO: 3.79 10*3/MM3 (ref 1.7–7)
NEUTROPHILS NFR BLD AUTO: 56.3 % (ref 42.7–76)
NRBC BLD AUTO-RTO: 0 /100 WBC (ref 0–0.2)
PLATELET # BLD AUTO: 235 10*3/MM3 (ref 140–450)
PMV BLD AUTO: 9.4 FL (ref 6–12)
POTASSIUM SERPL-SCNC: 4.2 MMOL/L (ref 3.5–4.7)
PROT SERPL-MCNC: 7.1 G/DL (ref 6.3–8)
RBC # BLD AUTO: 4.63 10*6/MM3 (ref 3.77–5.28)
SODIUM SERPL-SCNC: 141 MMOL/L (ref 134–145)
WBC # BLD AUTO: 6.74 10*3/MM3 (ref 3.4–10.8)

## 2020-08-21 PROCEDURE — 99213 OFFICE O/P EST LOW 20 MIN: CPT | Performed by: INTERNAL MEDICINE

## 2020-08-21 PROCEDURE — 85025 COMPLETE CBC W/AUTO DIFF WBC: CPT

## 2020-08-21 PROCEDURE — 36415 COLL VENOUS BLD VENIPUNCTURE: CPT

## 2020-08-21 PROCEDURE — 80053 COMPREHEN METABOLIC PANEL: CPT

## 2020-08-22 NOTE — PROGRESS NOTES
Subjective .     REASONS FOR FOLLOWUP:    1. Right breast DCIS ([id number]):  · Mammogram on 1/20/2020 showed new calcifications right breast measuring 6 mm.  By ultrasound there was a hypoechoic area in the 8 o'clock position.    · Biopsy 1/23/2020 of the right breast with finding of DCIS, high-grade with comedonecrosis, associated calcifications, solid, cribriform, and comedo growth patterns, largest area measuring 6 mm, ER negative, IA negative.  Biopsy performed at [facility] and reviewed at [facility] with agreement.    · The patient did undergo genetic testing with INVITAE panel test negative 2/6/2020.    · Breast MRI 2/13/2020 with finding of a 4.4 cm abnormality in the 8 o'clock position of the right breast with clip in place from prior biopsy.    · Given the size of the lesion, patient was felt not to be a candidate for lumpectomy.    · Right mastectomy with sentinel lymph node biopsy and tissue expander placement on 3/19/2020 with Dr. Hodges and Dr Gore.  Pathology showed high-grade DCIS, solid and cribriform with associated comedonecrosis.  Margins were negative.  The area of involvement with DCIS measured at least 4 cm.  [place] lymph nodes negative x3.  · Given patient's family history and personal history of DCIS, estimated risk of contralateral DCIS or invasive cancer felt to be in the 6-10% range, recommended chemoprevention for risk reduction.  Patient with underlying osteopenia and intact uterus.  Decision to pursue raloxifene 60 mg daily x5 years, initiated 3/26/2020.  · Due to poor visualization of prior DCIS on mammogram as well as issues with breast density and difficulty with mammographic evaluation in the past, plan annual follow-up left mammogram and MRI.    HISTORY OF PRESENT ILLNESS:  The patient is a 64 y.o. year old female who is here for follow-up with the above-mentioned history.    History of Present Illness patient returns today in follow-up continuing on chemoprevention with  raloxifene 60 mg daily.  She is tolerating this very well, has no significant side effects.  She denies any hot flashes.  She did undergo removal of her tissue expander placement of her implant on the right side at and a left-sided breast reduction on 7/20/2020.  This was done as an outpatient.  Pathology from the left breast reduction was negative for malignancy.  She did not have any perioperative difficulties.  She has been doing quite well, no complaints today.    Past Medical History:   Diagnosis Date   • Arthritis     knees   • Breast cancer (CMS/[facility]) 01/2020    Right DCIS   • Epilepsy (CMS/[facility])     last seizure 1990   • Floaters in visual field, bilateral    • H/O Gestational diabetes    • History of low back pain    • History of seizures    • History of vitamin D deficiency    • PONV (postoperative nausea and vomiting)      Past Surgical History:   Procedure Laterality Date   • BREAST BIOPSY Right 01/2020    malignant   • BREAST TISSUE EXPANDER INSERTION Right 3/19/2020    Procedure: RIGHT PLACEMENT OF TISSUE EXPANDER AND ALLODERM;  Surgeon: ABBIE Gore MD;  Location: [facility];  Service: Plastics;  Laterality: Right;   • BREAST TISSUE EXPANDER REMOVAL INSERTION OF IMPLANT Bilateral 7/20/2020    Procedure: REMOVAL RIGHT TISSUE EXPANDER AND PLACEMENT OF IMPLANT. LEFT REDUCTION FOR SYMMETRY;  Surgeon: ABBIE Gore MD;  Location: [facility];  Service: Plastics;  Laterality: Bilateral;   • COLONOSCOPY  2012    Normal   • COLONOSCOPY  2017   • GUM SURGERY      2000   • KNEE SURGERY      Both knees   • KNEE SURGERY Bilateral     meniscus   • MASTECTOMY WITH SENTINEL NODE BIOPSY AND AXILLARY NODE DISSECTION Right 3/19/2020    Procedure: BREAST MASTECTOMY WITH SENTINEL NODE BIOPSY;  Surgeon: Jerome Hodges MD;  Location: [facility];  Service: General;  Laterality: Right;         ONCOLOGIC HISTORY:  (History from previous dates can be found in the separate document.)    Current Outpatient  Medications on File Prior to Visit   Medication Sig Dispense Refill   • Acetaminophen (TYLENOL PO) Take  by mouth 2 (Two) Times a Day As Needed.     • B COMPLEX VITAMINS ER PO Take 1 tablet by mouth Every Morning.     • Cholecalciferol (VITAMIN D-1000 MAX ST) 25 MCG (1000 UT) tablet Take 1,000 Units by mouth Every Morning.     • [employer] CALCIUM CITRATE PO Take  by mouth.     • levETIRAcetam (KEPPRA) 500 MG tablet Take 750 mg by mouth 2 (Two) Times a Day.     • Multiple Vitamins-Minerals (ICAPS) tablet Take  by mouth. Stopped 3/5/2020     • Multiple Vitamins-Minerals (PRESERVISION AREDS 2 PO) Take 1 tablet by mouth 2 (Two) Times a Day. Stopped 3/5/2020     • Probiotic Product (PROBIOTIC PO) Take  by mouth.     • raloxifene (EVISTA) 60 MG tablet Take 1 tablet by mouth Daily. 60 tablet 3   • triamcinolone (KENALOG) 0.1 % ointment Apply 1 application topically to the appropriate area as directed As Needed (toe fungus).       No current facility-administered medications on file prior to visit.        ALLERGIES:     Allergies   Allergen Reactions   • Atropine Other (See Comments) and Unknown - High Severity     seizure   • Dramamine [Dimenhydrinate] Dizziness   • Penicillins Rash       Social History     Socioeconomic History   • Marital status:      Spouse name: Nikita   • Number of children: 2   • Years of education: Not on file   • Highest education level: Not on file   Occupational History     Employer: [employer]   Tobacco Use   • Smoking status: Never Smoker   • Smokeless tobacco: Never Used   Substance and Sexual Activity   • Alcohol use: Never     Frequency: Never   • Drug use: Never     Family History   Problem Relation Age of Onset   • Heart failure Mother    • Breast cancer Mother    • Arrhythmia Mother    • Dementia Mother    • Skin cancer Mother    • Thyroid disease Mother    • Heart failure Father    • Prostate cancer Father    • Colon polyps Father    • Skin cancer Father    • Hypertension Father    • Heart  disease Father    • Breast cancer Sister    • Thyroid disease Brother    • Dementia Maternal Grandmother    • Colon cancer Maternal Grandfather    • Diabetes Maternal Grandfather    • Stroke Maternal Grandfather    • Heart disease Paternal Grandmother    • Diabetes Maternal Uncle    • Malig Hyperthermia Neg Hx               Review of Systems   Constitutional: Negative for activity change, appetite change, fatigue, fever and unexpected weight change.   HENT: Negative for congestion, mouth sores, nosebleeds, sore throat and voice change.    Eyes: Negative for visual disturbance.   Respiratory: Negative for cough, shortness of breath and wheezing.    Cardiovascular: Negative for chest pain, palpitations and leg swelling.   Gastrointestinal: Negative for abdominal distention, abdominal pain, blood in stool, constipation, diarrhea, nausea and vomiting.   Endocrine: Negative for cold intolerance and heat intolerance.   Genitourinary: Negative for difficulty urinating, dysuria, frequency and hematuria.   Musculoskeletal: Negative for arthralgias, back pain, joint swelling and myalgias.   Skin: Negative for rash.   Neurological: Negative for dizziness, syncope, weakness, light-headedness, numbness and headaches.   Hematological: Negative for adenopathy. Does not bruise/bleed easily.   Psychiatric/Behavioral: Negative for confusion and sleep disturbance. The patient is not nervous/anxious.          Objective      Vitals:    08/21/20 1146   BP: 129/78   Pulse: 86   Resp: 20   Temp: 97.5 °F (36.4 °C)   SpO2: 99%      Current Status 8/21/2020   ECOG score 0   Pain 0/10    Physical Exam   Constitutional: She is oriented to person, place, and time. She appears well-developed and well-nourished.   HENT:   Mouth/Throat: Oropharynx is clear and moist.   Eyes: Conjunctivae are normal.   Neck: No thyromegaly present.   Cardiovascular: Normal rate and regular rhythm. Exam reveals no gallop and no friction rub.   No murmur  heard.  Pulmonary/Chest: Breath sounds normal. No respiratory distress.   Breast exam today with right mastectomy, implant in place, no masses or abnormalities detected.  Left breast status post recent reduction with healing surgical incision, no abnormalities detected.   Abdominal: Soft. Bowel sounds are normal. She exhibits no distension. There is no tenderness.   Musculoskeletal: She exhibits no edema.   Lymphadenopathy:        Head (right side): No submandibular adenopathy present.     She has no cervical adenopathy.     She has no axillary adenopathy.        Right: No inguinal and no supraclavicular adenopathy present.        Left: No inguinal and no supraclavicular adenopathy present.   Neurological: She is alert and oriented to person, place, and time. She displays normal reflexes. No cranial nerve deficit. She exhibits normal muscle tone.   Skin: Skin is warm and dry. No rash noted.   Psychiatric: She has a normal mood and affect. Her behavior is normal.       RECENT LABS:  Hematology WBC   Date Value Ref Range Status   08/21/2020 6.74 3.40 - 10.80 10*3/mm3 Final     RBC   Date Value Ref Range Status   08/21/2020 4.63 3.77 - 5.28 10*6/mm3 Final     Hemoglobin   Date Value Ref Range Status   08/21/2020 13.9 12.0 - 15.9 g/dL Final     Hematocrit   Date Value Ref Range Status   08/21/2020 41.5 34.0 - 46.6 % Final     Platelets   Date Value Ref Range Status   08/21/2020 235 140 - 450 10*3/mm3 Final        Lab Results   Component Value Date    GLUCOSE 117 08/21/2020    BUN 12 08/21/2020    CREATININE 0.77 08/21/2020    EGFRIFNONA 76 08/21/2020    BCR 15.6 08/21/2020    K 4.2 08/21/2020    CO2 25.6 08/21/2020    CALCIUM 9.4 08/21/2020    ALBUMIN 4.50 08/21/2020    LABIL2 1.6 10/10/2018    AST 24 08/21/2020    ALT 15 08/21/2020       Reviewed procedure note from recent plastic surgery procedure as well as pathology result as outlined above and below.    Assessment/Plan     1. Right breast DCIS  "([id number]):  · Menarche at age 15, spontaneous menopause at age 45-50.  Uterus is intact.  A1.  She received progesterone to \"stay pregnant\" in the past, no other hormonal treatment.  · Family history is significant for a mother with breast cancer (DCIS) at age 79, father with prostate cancer in his 70s, sister with breast cancer at age 64 (patient in our practice), and a paternal uncle with glioblastoma at age 84, maternal grandfather with colon cancer at age 74, maternal first cousin with an abdominal sarcoma at age 25-30.  · The patient has undergone routine previous mammograms, reports that she has had frequent six-month follow-up studies performed due to inability to accurately assess her breasts because of increased density.  She has never had a prior breast biopsy.    · Mammogram on 2020 showed new calcifications right breast measuring 6 mm.  By ultrasound there was a hypoechoic area in the 8 o'clock position.    · Biopsy 2020 of the right breast with finding of DCIS, high-grade with comedonecrosis, associated calcifications, solid, cribriform, and comedo growth patterns, largest area measuring 6 mm, ER negative, TN negative.  Biopsy performed at [facility] and reviewed at [facility] with agreement.    · The patient did undergo genetic testing with INVITAE panel test negative 2020.    · Breast MRI 2020 with finding of a 4.4 cm abnormality in the 8 o'clock position of the right breast with clip in place from prior biopsy.    · Given the size of the lesion, patient was felt not to be a candidate for lumpectomy.    · Right mastectomy with sentinel lymph node biopsy and tissue expander placement on 3/19/2020 with Dr. Hodges and Dr Gore.  Pathology showed high-grade DCIS, solid and cribriform with associated comedonecrosis.  Margins were negative.  The area of involvement with DCIS measured at least 4 cm.  [place] lymph nodes negative x3.  · Given patient's family history and personal history " of DCIS, estimated risk of contralateral DCIS or invasive cancer felt to be in the 6-10% range, recommended chemoprevention for risk reduction.  Patient with underlying osteopenia and intact uterus.  Decision to pursue raloxifene 60 mg daily x5 years, initiated 3/26/2020.  · Due to poor visualization of prior DCIS on mammogram as well as issues with breast density and difficulty with mammographic evaluation in the past, plan annual follow-up left mammogram and MRI.  · Status post tissue expander removal and implant placement on the right side as well as left breast reduction with plastic surgery on 7/20/2020.  Pathology from left breast reduction negative for malignancy.  · Patient returns today in follow-up continuing on chemoprevention with raloxifene 60 mg daily.  She is tolerating this well with no side effects.  Her exam today is negative.  She will return in 6 months for follow-up.  She will be due in January for left-sided mammogram and MRI which we will schedule for her.  2. Family history of colon cancer:  · Patient does have a family history of colon cancer in her maternal grandfather at age 74  · The patient does continue with routine colonoscopy, last performed in 2017 anticipating a 5-year interval follow-up.  3. Epilepsy:  · Patient does continue on Keppra, last seizure in 1990     Plan:  1. Continue chemoprevention with raloxifene 60 mg daily.   2. We will schedule annual left mammogram and breast MRI in January 2021.  3. MD visit in 6 months with CBC, CMP.

## 2020-09-10 ENCOUNTER — TELEPHONE (OUTPATIENT)
Dept: OTHER | Facility: HOSPITAL | Age: 65
End: 2020-09-10

## 2020-09-10 NOTE — TELEPHONE ENCOUNTER
Called Ms. Avila to see how she was doing. She stated she is doing well and has no needs at this time. We discussed our survivorship program and she was interested in getting an appointment but her insurance is changing and she would prefer not to pay a new deductible at this time. Offered for her treatment summary to be mailed to her and follow up with a phone call from Gail gutierrez. She would prefer to have that done. She was thankful for the call and will reach out if any questions or needs arise.

## 2020-09-15 RX ORDER — RALOXIFENE HYDROCHLORIDE 60 MG/1
60 TABLET, FILM COATED ORAL DAILY
Qty: 90 TABLET | Refills: 1 | Status: SHIPPED | OUTPATIENT
Start: 2020-09-15 | End: 2021-04-01

## 2020-09-21 ENCOUNTER — OFFICE VISIT (OUTPATIENT)
Dept: OTHER | Facility: HOSPITAL | Age: 65
End: 2020-09-21

## 2020-09-21 VITALS
SYSTOLIC BLOOD PRESSURE: 122 MMHG | WEIGHT: 158.9 LBS | HEART RATE: 86 BPM | BODY MASS INDEX: 25.66 KG/M2 | TEMPERATURE: 97 F | DIASTOLIC BLOOD PRESSURE: 70 MMHG | RESPIRATION RATE: 18 BRPM | OXYGEN SATURATION: 99 %

## 2020-09-21 DIAGNOSIS — D05.11 DUCTAL CARCINOMA IN SITU (DCIS) OF RIGHT BREAST: Primary | ICD-10-CM

## 2020-09-21 PROCEDURE — 99214 OFFICE O/P EST MOD 30 MIN: CPT | Performed by: NURSE PRACTITIONER

## 2020-09-21 PROCEDURE — G0463 HOSPITAL OUTPT CLINIC VISIT: HCPCS | Performed by: NURSE PRACTITIONER

## 2020-09-21 NOTE — PROGRESS NOTES
"  [facility] MULTIDISCIPLINARY CLINIC  SURVIVORSHIP VISIT    Honey Avila is a pleasant 64 y.o. female being followed by Phong Bella MD  for DCIS of the right breast. Here today in our Multidisciplinary Clinic, to review survivorship care plan.    AKILAH Ann has been doing extremely well since completion of reconstructive surgery.  She denies pain.  She denies limitations in range of motion.  She continues to work full-time.  Overall she feels she has a good understanding of her diagnosis treatment and follow-up plan.  Her biggest concern today is detecting signs and symptoms of recurrence or new cancer.  Overall she states she is just \"ready to put this behind me\"    TREATMENT HISTORY:     Oncology/Hematology History   Ductal carcinoma in situ (DCIS) of right breast   1/23/2020 Initial Diagnosis    Ductal carcinoma in situ (DCIS) of right breast     1/23/2020 Biopsy    Stereotactic right breast biopsy at [facility]:  Breast, right, lower outer quadrant, mid depth, ribbon clip, BI-RADS 4B, stereotactic biopsy for a 13 mm area of calcifications:  Ductal carcinoma in situ (DCIS), high nuclear grade with comedonecrosis.  Calcifications present in association with DCIS.  DCIS exhibits solid, cribriform, and comedo architectural patterns.  DCIS involves multiple cores (largest measured extent of DCIS in a single core is 6 mm.).  Tumor biomarkers:  Estrogen receptor (ER): Negative  Progesterone receptor ([place]): Negative  See synoptic report (below) for additional details.      Reviewed by pathology at [facility]:  1.  Right Breast, Lower Outer Quadrant, Stereotactic Biopsy            (outside slide [id number], [facility])               A.  High grade ductal carcinoma in situ (DCIS).                            1.  DCIS involves 5 of 7 core fragments measuring at least 6 mm.                             2.  Solid, cribriform and comedo type " DCIS.                             3.  Microcalcification present within foci of DCIS.                B.  No invasive carcinoma identified (P63 stain not provided).         2/6/2020 Genetic Testing    INVITAE 120 gene genetic testing showed variants of uncertain significance in:  · POLD1  · TERT  · TSC2     3/19/2020 Surgery    Right breast mastectomy with sentinel lymph node biopsy per Jerome Gauthier MD followed by immediate reconstruction with placement of right breast tissue expander per ABBIE Gore MD    1. Lymph Node, Right Stokes #1, Excision:               A. Benign sentinel lymph node (0/1).     2. Lymph Node, Right Stokes #2, Excision:               A. Benign sentinel lymph node (0/1).     3. Lymph Node, Right Stokes #3, Excision:               A. Benign sentinel lymph node (0/1).     4. Breast, Right, Mastectomy (747 grams):               A. Ductal carcinoma in-situ (DCIS), high nuclear grade, solid and cribriform type with associated comedo type                   Necrosis.                B. All margins free of tumor.               C. See synoptic template for complete tumor details.     3 negative sentinel lymph nodes  pTis pN0     3/26/2020 -  Hormonal Therapy    Raloxifene (Evista) 60 mg daily     7/20/2020 Surgery    Procedure: Removal of right tissue expander, placement of [place] round smooth high profile saline implant 500 to 600 cc size filled to 525 cc right side subpectoral, left breast reduction for symmetry periareolar vertical lateral technique  Surgeon: Robert Gore MD    1. Breast, Left, Reduction Mammoplasty (70.4 grams):                 A. Benign skin, subcutaneous tissue and breast parenchyma.         Allergies as of 09/21/2020 - Reviewed 09/21/2020   Allergen Reaction Noted   • Atropine Other (See Comments) and Unknown - High Severity 05/12/2015   • Dramamine [dimenhydrinate] Dizziness 03/19/2020   • Penicillins Rash 10/30/2012       MEDICATIONS:  I have reviewed the  medication list with the patient and I updated it in the electronic medical record. Medication dosages and frequencies were confirmed to be accurate with the patient.      Review of Systems   Constitutional: Negative for activity change, appetite change, chills, fatigue, fever and unexpected weight change.   Respiratory: Negative for cough, chest tightness and shortness of breath.    Cardiovascular: Negative for chest pain and leg swelling.   Gastrointestinal: Negative for blood in stool, constipation and diarrhea.   Genitourinary: Negative for hematuria.   Musculoskeletal: Positive for arthralgias (knees). Negative for myalgias.   Neurological: Negative for dizziness and light-headedness.   Psychiatric/Behavioral: Negative for decreased concentration, dysphoric mood and sleep disturbance. The patient is not nervous/anxious.        /70   Pulse 86   Temp 97 °F (36.1 °C) (Temporal)   Resp 18   Wt 72.1 kg (158 lb 14.4 oz)   SpO2 99% Comment: room air  BMI 25.66 kg/m²     Wt Readings from Last 3 Encounters:   09/21/20 72.1 kg (158 lb 14.4 oz)   08/21/20 71.6 kg (157 lb 14.4 oz)   07/20/20 72.7 kg (160 lb 4.4 oz)       Pain Score    09/21/20 0914   PainSc: 0-No pain       · Distress score: 0  · GAD7: 0  · PHQ9: 0      Physical Exam  Vitals signs reviewed.   Constitutional:       Appearance: Normal appearance.   HENT:      Head: Normocephalic and atraumatic.   Cardiovascular:      Rate and Rhythm: Normal rate and regular rhythm.   Pulmonary:      Effort: Pulmonary effort is normal.   Abdominal:      General: Abdomen is flat.      Palpations: Abdomen is soft.   Musculoskeletal: Normal range of motion.   Skin:     General: Skin is warm and dry.   Neurological:      Mental Status: She is alert and oriented to person, place, and time.   Psychiatric:         Attention and Perception: Attention and perception normal.         Mood and Affect: Mood normal.         Speech: Speech normal.         Behavior: Behavior is  cooperative.         Thought Content: Thought content normal.         Cognition and Memory: Cognition normal.         Judgment: Judgment normal.           Problem List Items Addressed This Visit        Active Problems    Ductal carcinoma in situ (DCIS) of right breast - Primary            SURVIVORSHIP DISCUSSION HELD TODAY:     Problems identified:  1. Lymphedema education: Discussed lymphedema causes, early signs and symptoms, prevention and management.  Patient provided with information packet from [employer] for her review.  She has no limitations in range of motion.  She denies any swelling or discomfort.  2. Physical activity: She was participating in Silver sneakers before the pandemic.  She does have some difficulties with high impact exercise due to arthritic knees.  She does use a stationary bicycle at home.  We discussed alternate strategies for weightbearing exercise for preservation of bone mass including body weight resistance, yoga, and walking at a moderate pace as tolerated.  3. BMI: We discussed current BMI of 25.5 and that it is within range of recommended target of 20-25 for wellness, cardiovascular health and risk reduction for cancer recurrence and prevention. We discussed availability of oncology registered dietician, Octavia Cortez and referral offered. Patient declines at this time. Octavia's contact info and handout describing recommended dietary modifications emphasizing whole foods, plant based diet provided.      4. Reports good support from spouse at home.  Overall feels positively about her treatment experience.  She is ready to put this behind her.  We did spend some time discussing signs and symptoms to report that may indicate sign of recurrence or development of new cancer including any new symptoms which worsen over time or do not go away, new profound fatigue, new loss of appetite and or any unintentional weight loss, new bone pain.  We reviewed her surveillance plan for  her breast cancer which will include mammography with MRI as well as continued breast self-exam/awareness and at least annual physical exam.    Referral recommendations:  1. Discussed [employer] for additional opportunities for exercise and is socially distant environment.  They have a range of exercise base programming available virtually that she will investigate.  She will continue to explore opportunities for physical activity.  2. Encouraged to call my office as needed for additional information or resources.  She expressed understanding.      Advance Care Planning   Advance Care Planning Discussion:    Patient does not have advance care planning complete. Brief discussion and written information provided regarding advance care planning and appropriateness for all healthy adults, choosing a healthcare surrogate, prior experiences with loved ones who have been seriously ill, exploration of goals of care in the event of a sudden injury or illness, and upcoming Advance Care Planning classes offered monthly at the [facility].             Discussed NCCN recommendations for all cancer survivors of 150 minutes/week moderate intensity exercise, achieve and maintain a healthy BMI, plants-based whole-foods diet, avoid tobacco and second hand smoke, avoid alcohol or minimize alcohol intake - no more than 1 drink in a day for women, 2 drinks in a day for men.     No orders of the defined types were placed in this encounter.      After review of the Survivorship Treatment Summary & Care Plan, the patient verbalized understanding of recommendations for follow-up. As outlined in the care plan, they were advised to continue with follow-up care in accordance with the NCCN surveillance guidelines while transitioning back to their primary care physician for continued general preventive and healthcare needs. We discussed the importance of healthy eating, exercise and weight management. We reviewed current guidelines  for routine screening of other cancers.     A copy of the Survivorship Treatment Summary & Care Plan for Ms. Avila (see below) was provided to and forwarded to the providers identified on the care team.      Greater than 25 minutes spent with patient, more than half of that spent face-to-face counseling patient extensively on treatment summary, surveillance for recurrence, late and long-term effects of disease and treatment, intimacy and self-image, preventative screening for other cancers, achieving/maintaining healthy BMI and link to risk reduction, adherence to current therapies, management of anxiety/uncertainty and self care strategies.

## 2020-10-01 ENCOUNTER — TELEPHONE (OUTPATIENT)
Dept: ONCOLOGY | Facility: CLINIC | Age: 65
End: 2020-10-01

## 2020-10-01 NOTE — TELEPHONE ENCOUNTER
Patient said [facility] is having issue with updating her prescription for raloxifene to 90 day supply which she is needing for insurance purposes. Said that [facility] has been trying to contact us in regards to this but has not been able to reach us. Patient would like us to confirm with [facility] she will be able to pickup 90 day supply next time it is due. Would like callback once we have taken care of this.    Callback# [phone]

## 2020-10-01 NOTE — TELEPHONE ENCOUNTER
PT CALLING STATING THAT [facility] IS SAYING THEY DON'T HAVE HER RX FOR EVISTA #90. WE E-SCRIBED IT W/ CONFIRMATION REC'D ON 9/15. CALLED [facility] AND WAS UNABLE TO REACH ANYONE. L/M W/ THEM W/ DETAILS.

## 2021-01-21 ENCOUNTER — HOSPITAL ENCOUNTER (OUTPATIENT)
Dept: MRI IMAGING | Facility: HOSPITAL | Age: 66
Discharge: HOME | End: 2021-01-21

## 2021-01-21 ENCOUNTER — HOSPITAL ENCOUNTER (OUTPATIENT)
Dept: MAMMOGRAPHY | Facility: HOSPITAL | Age: 66
Discharge: HOME | End: 2021-01-21

## 2021-01-21 DIAGNOSIS — D05.11 DUCTAL CARCINOMA IN SITU (DCIS) OF RIGHT BREAST: ICD-10-CM

## 2021-01-21 PROCEDURE — 77063 BREAST TOMOSYNTHESIS BI: CPT

## 2021-01-21 PROCEDURE — 77049 MRI BREAST C-+ W/CAD BI: CPT

## 2021-01-21 PROCEDURE — 82565 ASSAY OF CREATININE: CPT

## 2021-01-21 PROCEDURE — 77067 SCR MAMMO BI INCL CAD: CPT

## 2021-01-21 PROCEDURE — A9577 INJ MULTIHANCE: HCPCS | Performed by: INTERNAL MEDICINE

## 2021-01-21 PROCEDURE — 0 GADOBENATE DIMEGLUMINE 529 MG/ML SOLUTION: Performed by: INTERNAL MEDICINE

## 2021-01-21 RX ADMIN — GADOBENATE DIMEGLUMINE 15 ML: 529 INJECTION, SOLUTION INTRAVENOUS at 10:28

## 2021-01-22 LAB — CREAT BLDA-MCNC: 0.6 MG/DL (ref 0.6–1.3)

## 2021-02-02 ENCOUNTER — OFFICE VISIT (OUTPATIENT)
Dept: ONCOLOGY | Facility: CLINIC | Age: 66
End: 2021-02-02

## 2021-02-02 ENCOUNTER — LAB (OUTPATIENT)
Dept: LAB | Facility: HOSPITAL | Age: 66
End: 2021-02-02

## 2021-02-02 VITALS
BODY MASS INDEX: 26.16 KG/M2 | HEIGHT: 66 IN | RESPIRATION RATE: 18 BRPM | HEART RATE: 91 BPM | TEMPERATURE: 98.2 F | WEIGHT: 162.8 LBS | DIASTOLIC BLOOD PRESSURE: 74 MMHG | OXYGEN SATURATION: 98 % | SYSTOLIC BLOOD PRESSURE: 137 MMHG

## 2021-02-02 DIAGNOSIS — D05.11 DUCTAL CARCINOMA IN SITU (DCIS) OF RIGHT BREAST: Primary | ICD-10-CM

## 2021-02-02 DIAGNOSIS — D05.11 DUCTAL CARCINOMA IN SITU (DCIS) OF RIGHT BREAST: ICD-10-CM

## 2021-02-02 LAB
ALBUMIN SERPL-MCNC: 4.4 G/DL (ref 3.5–5.2)
ALBUMIN/GLOB SERPL: 1.8 G/DL (ref 1.1–2.4)
ALP SERPL-CCNC: 66 U/L (ref 38–116)
ALT SERPL W P-5'-P-CCNC: 16 U/L (ref 0–33)
ANION GAP SERPL CALCULATED.3IONS-SCNC: 12.1 MMOL/L (ref 5–15)
AST SERPL-CCNC: 21 U/L (ref 0–32)
BASOPHILS # BLD AUTO: 0.03 10*3/MM3 (ref 0–0.2)
BASOPHILS NFR BLD AUTO: 0.4 % (ref 0–1.5)
BILIRUB SERPL-MCNC: 0.7 MG/DL (ref 0.2–1.2)
BUN SERPL-MCNC: 13 MG/DL (ref 6–20)
BUN/CREAT SERPL: 18.3 (ref 7.3–30)
CALCIUM SPEC-SCNC: 9.5 MG/DL (ref 8.5–10.2)
CHLORIDE SERPL-SCNC: 104 MMOL/L (ref 98–107)
CO2 SERPL-SCNC: 24.9 MMOL/L (ref 22–29)
CREAT SERPL-MCNC: 0.71 MG/DL (ref 0.6–1.1)
DEPRECATED RDW RBC AUTO: 45.9 FL (ref 37–54)
EOSINOPHIL # BLD AUTO: 0.1 10*3/MM3 (ref 0–0.4)
EOSINOPHIL NFR BLD AUTO: 1.5 % (ref 0.3–6.2)
ERYTHROCYTE [DISTWIDTH] IN BLOOD BY AUTOMATED COUNT: 13.7 % (ref 12.3–15.4)
GFR SERPL CREATININE-BSD FRML MDRD: 83 ML/MIN/1.73
GLOBULIN UR ELPH-MCNC: 2.5 GM/DL (ref 1.8–3.5)
GLUCOSE SERPL-MCNC: 116 MG/DL (ref 74–124)
HCT VFR BLD AUTO: 40.4 % (ref 34–46.6)
HGB BLD-MCNC: 13.9 G/DL (ref 12–15.9)
IMM GRANULOCYTES # BLD AUTO: 0.02 10*3/MM3 (ref 0–0.05)
IMM GRANULOCYTES NFR BLD AUTO: 0.3 % (ref 0–0.5)
LYMPHOCYTES # BLD AUTO: 2.08 10*3/MM3 (ref 0.7–3.1)
LYMPHOCYTES NFR BLD AUTO: 30.9 % (ref 19.6–45.3)
MCH RBC QN AUTO: 31.3 PG (ref 26.6–33)
MCHC RBC AUTO-ENTMCNC: 34.4 G/DL (ref 31.5–35.7)
MCV RBC AUTO: 91 FL (ref 79–97)
MONOCYTES # BLD AUTO: 0.48 10*3/MM3 (ref 0.1–0.9)
MONOCYTES NFR BLD AUTO: 7.1 % (ref 5–12)
NEUTROPHILS NFR BLD AUTO: 4.02 10*3/MM3 (ref 1.7–7)
NEUTROPHILS NFR BLD AUTO: 59.8 % (ref 42.7–76)
NRBC BLD AUTO-RTO: 0 /100 WBC (ref 0–0.2)
PLATELET # BLD AUTO: 232 10*3/MM3 (ref 140–450)
PMV BLD AUTO: 9.5 FL (ref 6–12)
POTASSIUM SERPL-SCNC: 3.8 MMOL/L (ref 3.5–4.7)
PROT SERPL-MCNC: 6.9 G/DL (ref 6.3–8)
RBC # BLD AUTO: 4.44 10*6/MM3 (ref 3.77–5.28)
SODIUM SERPL-SCNC: 141 MMOL/L (ref 134–145)
WBC # BLD AUTO: 6.73 10*3/MM3 (ref 3.4–10.8)

## 2021-02-02 PROCEDURE — 36415 COLL VENOUS BLD VENIPUNCTURE: CPT

## 2021-02-02 PROCEDURE — 80053 COMPREHEN METABOLIC PANEL: CPT

## 2021-02-02 PROCEDURE — 85025 COMPLETE CBC W/AUTO DIFF WBC: CPT

## 2021-02-02 PROCEDURE — 99214 OFFICE O/P EST MOD 30 MIN: CPT | Performed by: INTERNAL MEDICINE

## 2021-02-02 NOTE — PROGRESS NOTES
"Chief Complaint  Right breast DCIS    Subjective        History of Present Illness  Patient returns today in follow-up continuing on chemoprevention with raloxifene 60 mg daily (initiated 3/26/2020) x5 years.  Patient did undergo survivorship visit on 9/21/2020.  She did undergo her annual breast MRI and mammogram on 1/21/2021 and is here to review results today.  She has no complaints in the interval.  She has tolerated raloxifene without any side effects.      Objective   Vital Signs:   /74   Pulse 91   Temp 98.2 °F (36.8 °C) (Temporal)   Resp 18   Ht 167.6 cm (65.98\")   Wt 73.8 kg (162 lb 12.8 oz)   SpO2 98%   BMI 26.29 kg/m²     Physical Exam  Constitutional:       Appearance: She is well-developed.   Eyes:      Conjunctiva/sclera: Conjunctivae normal.   Neck:      Thyroid: No thyromegaly.   Cardiovascular:      Rate and Rhythm: Normal rate and regular rhythm.      Heart sounds: No murmur. No friction rub. No gallop.    Pulmonary:      Effort: No respiratory distress.      Breath sounds: Normal breath sounds.      Comments: Status post right mastectomy with implant reconstruction, no masses or abnormalities palpated.  Left breast without masses or abnormalities palpated.  Abdominal:      General: Bowel sounds are normal. There is no distension.      Palpations: Abdomen is soft.      Tenderness: There is no abdominal tenderness.   Lymphadenopathy:      Head:      Right side of head: No submandibular adenopathy.      Cervical: No cervical adenopathy.      Upper Body:      Right upper body: No supraclavicular adenopathy.      Left upper body: No supraclavicular adenopathy.   Skin:     General: Skin is warm and dry.      Findings: No rash.   Neurological:      Mental Status: She is alert and oriented to person, place, and time.      Cranial Nerves: No cranial nerve deficit.      Motor: No abnormal muscle tone.      Deep Tendon Reflexes: Reflexes normal.   Psychiatric:         Behavior: Behavior normal. " "       Result Review : Reviewed CBC, CMP today.  Reviewed mammogram and breast MRI from 2021.  Reviewed survivorship note 2020       Assessment and Plan  1. Right breast DCIS ([id number]):  · Menarche at age 15, spontaneous menopause at age 45-50.  Uterus is intact.  A1.  She received progesterone to \"stay pregnant\" in the past, no other hormonal treatment.  · Family history is significant for a mother with breast cancer (DCIS) at age 79, father with prostate cancer in his 70s, sister with breast cancer at age 64 (patient in our practice), and a paternal uncle with glioblastoma at age 84, maternal grandfather with colon cancer at age 74, maternal first cousin with an abdominal sarcoma at age 25-30.  · The patient has undergone routine previous mammograms, reports that she has had frequent six-month follow-up studies performed due to inability to accurately assess her breasts because of increased density.  She has never had a prior breast biopsy.    · Mammogram on 2020 showed new calcifications right breast measuring 6 mm.  By ultrasound there was a hypoechoic area in the 8 o'clock position.    · Biopsy 2020 of the right breast with finding of DCIS, high-grade with comedonecrosis, associated calcifications, solid, cribriform, and comedo growth patterns, largest area measuring 6 mm, ER negative, [place] negative.  Biopsy performed at [facility] and reviewed at [facility] with agreement.    · The patient did undergo genetic testing with INVITAE panel test negative 2020.    · Breast MRI 2020 with finding of a 4.4 cm abnormality in the 8 o'clock position of the right breast with clip in place from prior biopsy.    · Given the size of the lesion, patient was felt not to be a candidate for lumpectomy.    · Right mastectomy with sentinel lymph node biopsy and tissue expander placement on 3/19/2020 with Dr. Hodges and Dr Gore.  Pathology showed high-grade DCIS, solid and cribriform with associated " comedonecrosis.  Margins were negative.  The area of involvement with DCIS measured at least 4 cm.  [place] lymph nodes negative x3.  · Given patient's family history and personal history of DCIS, estimated risk of contralateral DCIS or invasive cancer felt to be in the 6-10% range, recommended chemoprevention for risk reduction.  Patient with underlying osteopenia and intact uterus.  Decision to pursue raloxifene 60 mg daily x5 years, initiated 3/26/2020.  · Due to poor visualization of prior DCIS on mammogram as well as issues with breast density and difficulty with mammographic evaluation in the past, plan annual follow-up left mammogram and MRI.  · Status post tissue expander removal and implant placement on the right side as well as left breast reduction with plastic surgery on 7/20/2020.  Pathology from left breast reduction negative for malignancy.  · Patient returns today in follow-up continuing on chemoprevention with raloxifene 60 mg daily.  She is tolerating this well with no side effects.  Her exam today is negative.    We did review results from her mammogram and breast MRI from 1/21/2021, both negative, BI-RADS 1.  I will plan to see her back in 6 months for follow-up.  2. Family history of colon cancer:  · Patient does have a family history of colon cancer in her maternal grandfather at age 74  · The patient does continue with routine colonoscopy, last performed in 2017 anticipating a 5-year interval follow-up.  3. Epilepsy:  · Patient does continue on Keppra, last seizure in 1990     Plan:  1. Continue chemoprevention with raloxifene 60 mg daily.   2. MD visit in 6 months with CBC, CMP.

## 2021-03-19 ENCOUNTER — BULK ORDERING (OUTPATIENT)
Dept: CASE MANAGEMENT | Facility: OTHER | Age: 66
End: 2021-03-19

## 2021-03-19 DIAGNOSIS — Z23 IMMUNIZATION DUE: ICD-10-CM

## 2021-04-01 RX ORDER — RALOXIFENE HYDROCHLORIDE 60 MG/1
TABLET, FILM COATED ORAL
Qty: 90 TABLET | Refills: 1 | Status: SHIPPED | OUTPATIENT
Start: 2021-04-01 | End: 2021-10-25

## 2021-08-03 ENCOUNTER — TRANSCRIBE ORDERS (OUTPATIENT)
Dept: ADMINISTRATIVE | Facility: HOSPITAL | Age: 66
End: 2021-08-03

## 2021-08-03 ENCOUNTER — LAB (OUTPATIENT)
Dept: LAB | Facility: HOSPITAL | Age: 66
End: 2021-08-03

## 2021-08-03 ENCOUNTER — OFFICE VISIT (OUTPATIENT)
Dept: ONCOLOGY | Facility: CLINIC | Age: 66
End: 2021-08-03

## 2021-08-03 VITALS
SYSTOLIC BLOOD PRESSURE: 152 MMHG | WEIGHT: 154.1 LBS | TEMPERATURE: 97.5 F | RESPIRATION RATE: 16 BRPM | OXYGEN SATURATION: 99 % | HEIGHT: 66 IN | DIASTOLIC BLOOD PRESSURE: 77 MMHG | BODY MASS INDEX: 24.77 KG/M2 | HEART RATE: 79 BPM

## 2021-08-03 DIAGNOSIS — D05.11 DUCTAL CARCINOMA IN SITU (DCIS) OF RIGHT BREAST: Primary | ICD-10-CM

## 2021-08-03 DIAGNOSIS — D05.11 DUCTAL CARCINOMA IN SITU (DCIS) OF RIGHT BREAST: ICD-10-CM

## 2021-08-03 DIAGNOSIS — Z12.39 SCREENING BREAST EXAMINATION: Primary | ICD-10-CM

## 2021-08-03 LAB
ALBUMIN SERPL-MCNC: 4.5 G/DL (ref 3.5–5.2)
ALBUMIN/GLOB SERPL: 1.9 G/DL (ref 1.1–2.4)
ALP SERPL-CCNC: 61 U/L (ref 38–116)
ALT SERPL W P-5'-P-CCNC: 14 U/L (ref 0–33)
ANION GAP SERPL CALCULATED.3IONS-SCNC: 12.4 MMOL/L (ref 5–15)
AST SERPL-CCNC: 21 U/L (ref 0–32)
BASOPHILS # BLD AUTO: 0.04 10*3/MM3 (ref 0–0.2)
BASOPHILS NFR BLD AUTO: 0.5 % (ref 0–1.5)
BILIRUB SERPL-MCNC: 0.9 MG/DL (ref 0.2–1.2)
BUN SERPL-MCNC: 15 MG/DL (ref 6–20)
BUN/CREAT SERPL: 20.8 (ref 7.3–30)
CALCIUM SPEC-SCNC: 9.7 MG/DL (ref 8.5–10.2)
CHLORIDE SERPL-SCNC: 105 MMOL/L (ref 98–107)
CO2 SERPL-SCNC: 24.6 MMOL/L (ref 22–29)
CREAT SERPL-MCNC: 0.72 MG/DL (ref 0.6–1.1)
DEPRECATED RDW RBC AUTO: 45.3 FL (ref 37–54)
EOSINOPHIL # BLD AUTO: 0.09 10*3/MM3 (ref 0–0.4)
EOSINOPHIL NFR BLD AUTO: 1.2 % (ref 0.3–6.2)
ERYTHROCYTE [DISTWIDTH] IN BLOOD BY AUTOMATED COUNT: 13.7 % (ref 12.3–15.4)
GFR SERPL CREATININE-BSD FRML MDRD: 82 ML/MIN/1.73
GLOBULIN UR ELPH-MCNC: 2.4 GM/DL (ref 1.8–3.5)
GLUCOSE SERPL-MCNC: 87 MG/DL (ref 74–124)
HCT VFR BLD AUTO: 40 % (ref 34–46.6)
HGB BLD-MCNC: 13.5 G/DL (ref 12–15.9)
IMM GRANULOCYTES # BLD AUTO: 0.02 10*3/MM3 (ref 0–0.05)
IMM GRANULOCYTES NFR BLD AUTO: 0.3 % (ref 0–0.5)
LYMPHOCYTES # BLD AUTO: 2.06 10*3/MM3 (ref 0.7–3.1)
LYMPHOCYTES NFR BLD AUTO: 27.5 % (ref 19.6–45.3)
MCH RBC QN AUTO: 30.5 PG (ref 26.6–33)
MCHC RBC AUTO-ENTMCNC: 33.8 G/DL (ref 31.5–35.7)
MCV RBC AUTO: 90.5 FL (ref 79–97)
MONOCYTES # BLD AUTO: 0.71 10*3/MM3 (ref 0.1–0.9)
MONOCYTES NFR BLD AUTO: 9.5 % (ref 5–12)
NEUTROPHILS NFR BLD AUTO: 4.57 10*3/MM3 (ref 1.7–7)
NEUTROPHILS NFR BLD AUTO: 61 % (ref 42.7–76)
NRBC BLD AUTO-RTO: 0 /100 WBC (ref 0–0.2)
PLATELET # BLD AUTO: 249 10*3/MM3 (ref 140–450)
PMV BLD AUTO: 9.4 FL (ref 6–12)
POTASSIUM SERPL-SCNC: 4 MMOL/L (ref 3.5–4.7)
PROT SERPL-MCNC: 6.9 G/DL (ref 6.3–8)
RBC # BLD AUTO: 4.42 10*6/MM3 (ref 3.77–5.28)
SODIUM SERPL-SCNC: 142 MMOL/L (ref 134–145)
WBC # BLD AUTO: 7.49 10*3/MM3 (ref 3.4–10.8)

## 2021-08-03 PROCEDURE — 80053 COMPREHEN METABOLIC PANEL: CPT

## 2021-08-03 PROCEDURE — 99214 OFFICE O/P EST MOD 30 MIN: CPT | Performed by: INTERNAL MEDICINE

## 2021-08-03 PROCEDURE — 85025 COMPLETE CBC W/AUTO DIFF WBC: CPT

## 2021-08-03 PROCEDURE — 36415 COLL VENOUS BLD VENIPUNCTURE: CPT

## 2021-08-03 RX ORDER — ACETAMINOPHEN 500 MG
TABLET ORAL
COMMUNITY

## 2021-08-03 RX ORDER — MULTIPLE VITAMINS W/ MINERALS TAB 9MG-400MCG
1 TAB ORAL DAILY
COMMUNITY

## 2021-08-03 RX ORDER — ASCORBIC ACID 125 MG
100 TABLET,CHEWABLE ORAL
COMMUNITY

## 2021-08-03 RX ORDER — ECHINACEA PURPUREA EXTRACT 125 MG
1 TABLET ORAL AS NEEDED
COMMUNITY

## 2021-08-03 NOTE — PROGRESS NOTES
"Chief Complaint  Right breast DCIS    Subjective        History of Present Illness  Patient returns today in follow-up continuing on chemoprevention with raloxifene 60 mg daily (initiated 3/26/2020) x5 years.  The patient continues to tolerate raloxifene well without significant side effects.  She notes that she has just been diagnosed with hypertension which is a new finding.  She is unsure why she has developed hypertension.  She has had some difficulty recently with recurrence of vertigo.  She has had this intermittently for many years.  She did see physical therapy with vestibular maneuvers performed yesterday.      Objective   Vital Signs:   /77   Pulse 79   Temp 97.5 °F (36.4 °C) (Temporal)   Resp 16   Ht 167.6 cm (65.98\")   Wt 69.9 kg (154 lb 1.6 oz)   SpO2 99%   BMI 24.88 kg/m²     Physical Exam  Constitutional:       Appearance: She is well-developed.   Eyes:      Conjunctiva/sclera: Conjunctivae normal.   Neck:      Thyroid: No thyromegaly.   Cardiovascular:      Rate and Rhythm: Normal rate and regular rhythm.      Heart sounds: No murmur heard.   No friction rub. No gallop.    Pulmonary:      Effort: No respiratory distress.      Breath sounds: Normal breath sounds.      Comments: Status post right mastectomy with implant reconstruction, no masses or abnormalities palpated.  Left breast without masses or abnormalities palpated.  Abdominal:      General: Bowel sounds are normal. There is no distension.      Palpations: Abdomen is soft.      Tenderness: There is no abdominal tenderness.   Lymphadenopathy:      Head:      Right side of head: No submandibular adenopathy.      Cervical: No cervical adenopathy.      Upper Body:      Right upper body: No supraclavicular adenopathy.      Left upper body: No supraclavicular adenopathy.   Skin:     General: Skin is warm and dry.      Findings: No rash.   Neurological:      Mental Status: She is alert and oriented to person, place, and time.      " "Cranial Nerves: No cranial nerve deficit.      Motor: No abnormal muscle tone.      Deep Tendon Reflexes: Reflexes normal.   Psychiatric:         Behavior: Behavior normal.     The patient was examined today, unchanged from above      Result Review : Reviewed CBC, CMP from today.       Assessment and Plan  1. Right breast DCIS ([id number]):  · Menarche at age 15, spontaneous menopause at age 45-50.  Uterus is intact.  A1.  She received progesterone to \"stay pregnant\" in the past, no other hormonal treatment.  · Family history is significant for a mother with breast cancer (DCIS) at age 79, father with prostate cancer in his 70s, sister with breast cancer at age 64 (patient in our practice), and a paternal uncle with glioblastoma at age 84, maternal grandfather with colon cancer at age 74, maternal first cousin with an abdominal sarcoma at age 25-30.  · The patient has undergone routine previous mammograms, reports that she has had frequent six-month follow-up studies performed due to inability to accurately assess her breasts because of increased density.  She has never had a prior breast biopsy.    · Mammogram on 2020 showed new calcifications right breast measuring 6 mm.  By ultrasound there was a hypoechoic area in the 8 o'clock position.    · Biopsy 2020 of the right breast with finding of DCIS, high-grade with comedonecrosis, associated calcifications, solid, cribriform, and comedo growth patterns, largest area measuring 6 mm, ER negative, [place] negative.  Biopsy performed at [facility] and reviewed at [facility] with agreement.    · The patient did undergo genetic testing with INVITAE panel test negative 2020.    · Breast MRI 2020 with finding of a 4.4 cm abnormality in the 8 o'clock position of the right breast with clip in place from prior biopsy.    · Given the size of the lesion, patient was felt not to be a candidate for lumpectomy.    · Right mastectomy with sentinel lymph node biopsy and " tissue expander placement on 3/19/2020 with Dr. Hodges and Dr Gore.  Pathology showed high-grade DCIS, solid and cribriform with associated comedonecrosis.  Margins were negative.  The area of involvement with DCIS measured at least 4 cm.  [place] lymph nodes negative x3.  · Given patient's family history and personal history of DCIS, estimated risk of contralateral DCIS or invasive cancer felt to be in the 6-10% range, recommended chemoprevention for risk reduction.  Patient with underlying osteopenia and intact uterus.  Decision to pursue raloxifene 60 mg daily x5 years, initiated 3/26/2020.  · Due to poor visualization of prior DCIS on mammogram as well as issues with breast density and difficulty with mammographic evaluation in the past, plan annual follow-up left mammogram and MRI.  · Status post tissue expander removal and implant placement on the right side as well as left breast reduction with plastic surgery on 7/20/2020.  Pathology from left breast reduction negative for malignancy.  · Patient returns today in follow-up continuing on chemoprevention with raloxifene 60 mg daily.    She continues to tolerate this well without any significant side effects.  Her exam today is negative.  We will go ahead and schedule her for her left-sided mammogram and breast MRI in January 2022.  I will see her back in 6 months for follow-up.  2. Family history of colon cancer:  · Patient does have a family history of colon cancer in her maternal grandfather at age 74  · The patient does continue with routine colonoscopy, last performed in 2017 anticipating a 5-year interval follow-up which will be due next year in 2022.  3. Epilepsy:  · Patient does continue on Keppra, last seizure in 1990  4. Recent onset hypertension  · Patient is following up with her primary care physician regarding this issue.  She had questions today as far as why she has hypertension at this point in her life.  She has not been as physically active  or exercising recently and we did discuss nonpharmacologic lifestyle changes that could help including dietary management and regular exercise.  5. Chronic intermittent vertigo  · Patient with recent recurrence of vertigo, was just seen by physical therapy and underwent vestibular maneuvers with improvement.     Plan:  1. Continue chemoprevention with raloxifene 60 mg daily.   2. MD visit in 6 months with CBC, CMP.

## 2021-10-18 NOTE — TELEPHONE ENCOUNTER
I told her the path report showed a 40 mm area of DCIS but no invasion.  Her margins are clear and the sentinel lymph nodes are benign   dx,hx,meds/coagulation(Bleeding disorder R/T clinical cond/anti-coags)/other

## 2021-10-25 RX ORDER — RALOXIFENE HYDROCHLORIDE 60 MG/1
TABLET, FILM COATED ORAL
Qty: 90 TABLET | Refills: 1 | Status: SHIPPED | OUTPATIENT
Start: 2021-10-25 | End: 2022-04-21

## 2022-01-24 ENCOUNTER — HOSPITAL ENCOUNTER (OUTPATIENT)
Dept: MAMMOGRAPHY | Facility: HOSPITAL | Age: 67
Discharge: HOME | End: 2022-01-24

## 2022-01-24 ENCOUNTER — HOSPITAL ENCOUNTER (OUTPATIENT)
Dept: MRI IMAGING | Facility: HOSPITAL | Age: 67
Discharge: HOME | End: 2022-01-24

## 2022-01-24 DIAGNOSIS — Z12.39 SCREENING BREAST EXAMINATION: ICD-10-CM

## 2022-01-24 DIAGNOSIS — D05.11 DUCTAL CARCINOMA IN SITU (DCIS) OF RIGHT BREAST: ICD-10-CM

## 2022-01-24 LAB — CREAT BLDA-MCNC: 0.7 MG/DL (ref 0.6–1.3)

## 2022-01-24 PROCEDURE — A9577 INJ MULTIHANCE: HCPCS | Performed by: INTERNAL MEDICINE

## 2022-01-24 PROCEDURE — 0 GADOBENATE DIMEGLUMINE 529 MG/ML SOLUTION: Performed by: INTERNAL MEDICINE

## 2022-01-24 PROCEDURE — 77049 MRI BREAST C-+ W/CAD BI: CPT

## 2022-01-24 PROCEDURE — 77063 BREAST TOMOSYNTHESIS BI: CPT

## 2022-01-24 PROCEDURE — 82565 ASSAY OF CREATININE: CPT

## 2022-01-24 PROCEDURE — 77067 SCR MAMMO BI INCL CAD: CPT

## 2022-01-24 RX ADMIN — GADOBENATE DIMEGLUMINE 14 ML: 529 INJECTION, SOLUTION INTRAVENOUS at 14:26

## 2022-02-08 NOTE — PROGRESS NOTES
"Chief Complaint  Right breast DCIS    Subjective        History of Present Illness  Patient returns today in follow-up continuing on chemoprevention with raloxifene 60 mg daily (initiated 3/26/2020) x5 years.  Today she has laboratory studies as well as annual MRI and mammogram to review.  She has done well in the interval since her last visit.  She tolerates raloxifene without any side effects.  She has not experienced any other significant medical issues.  She has had some borderline hypertension, is not currently receiving any medication for this.      Objective   Vital Signs:   /78   Pulse 92   Temp 97.5 °F (36.4 °C) (Temporal)   Resp 17   Ht 167.6 cm (65.98\")   Wt 70 kg (154 lb 4.8 oz)   SpO2 100%   BMI 24.92 kg/m²     Physical Exam  Constitutional:       Appearance: She is well-developed.   Eyes:      Conjunctiva/sclera: Conjunctivae normal.   Neck:      Thyroid: No thyromegaly.   Cardiovascular:      Rate and Rhythm: Normal rate and regular rhythm.      Heart sounds: No murmur heard.  No friction rub. No gallop.    Pulmonary:      Effort: No respiratory distress.      Breath sounds: Normal breath sounds.      Comments: Status post right mastectomy with implant reconstruction, no masses or abnormalities palpated.  Left breast without masses or abnormalities palpated.  Chest:   Breasts:      Right: No supraclavicular adenopathy.      Left: No supraclavicular adenopathy.       Abdominal:      General: Bowel sounds are normal. There is no distension.      Palpations: Abdomen is soft.      Tenderness: There is no abdominal tenderness.   Lymphadenopathy:      Head:      Right side of head: No submandibular adenopathy.      Cervical: No cervical adenopathy.      Upper Body:      Right upper body: No supraclavicular adenopathy.      Left upper body: No supraclavicular adenopathy.   Skin:     General: Skin is warm and dry.      Findings: No rash.   Neurological:      Mental Status: She is alert and " "oriented to person, place, and time.      Cranial Nerves: No cranial nerve deficit.      Motor: No abnormal muscle tone.      Deep Tendon Reflexes: Reflexes normal.   Psychiatric:         Behavior: Behavior normal.     Patient was examined today, unchanged from above      Result Review : Reviewed CBC, CMP from today.  Reviewed mammogram and breast MRI from 2022.       Assessment and Plan  1. Right breast DCIS ([id number]):  · Menarche at age 15, spontaneous menopause at age 45-50.  Uterus is intact.  A1.  She received progesterone to \"stay pregnant\" in the past, no other hormonal treatment.  · Family history is significant for a mother with breast cancer (DCIS) at age 79, father with prostate cancer in his 70s, sister with breast cancer at age 64 (patient in our practice), and a paternal uncle with glioblastoma at age 84, maternal grandfather with colon cancer at age 74, maternal first cousin with an abdominal sarcoma at age 25-30.  · The patient has undergone routine previous mammograms, reports that she has had frequent six-month follow-up studies performed due to inability to accurately assess her breasts because of increased density.  She has never had a prior breast biopsy.    · Mammogram on 2020 showed new calcifications right breast measuring 6 mm.  By ultrasound there was a hypoechoic area in the 8 o'clock position.    · Biopsy 2020 of the right breast with finding of DCIS, high-grade with comedonecrosis, associated calcifications, solid, cribriform, and comedo growth patterns, largest area measuring 6 mm, ER negative, [place] negative.  Biopsy performed at [facility] and reviewed at [facility] with agreement.    · The patient did undergo genetic testing with INVITAE panel test negative 2020.    · Breast MRI 2020 with finding of a 4.4 cm abnormality in the 8 o'clock position of the right breast with clip in place from prior biopsy.    · Given the size of the lesion, patient was felt not to " be a candidate for lumpectomy.    · Right mastectomy with sentinel lymph node biopsy and tissue expander placement on 3/19/2020 with Dr. Hodges and Dr Gore.  Pathology showed high-grade DCIS, solid and cribriform with associated comedonecrosis.  Margins were negative.  The area of involvement with DCIS measured at least 4 cm.  [place] lymph nodes negative x3.  · Given patient's family history and personal history of DCIS, estimated risk of contralateral DCIS or invasive cancer felt to be in the 6-10% range, recommended chemoprevention for risk reduction.  Patient with underlying osteopenia and intact uterus.  Decision to pursue raloxifene 60 mg daily x5 years, initiated 3/26/2020.  · Due to poor visualization of prior DCIS on mammogram as well as issues with breast density and difficulty with mammographic evaluation in the past, plan annual follow-up left mammogram and MRI.  · Status post tissue expander removal and implant placement on the right side as well as left breast reduction with plastic surgery on 7/20/2020.  Pathology from left breast reduction negative for malignancy.  · Patient returns today in follow-up continuing on chemoprevention with raloxifene 60 mg daily.  The patient today continues to tolerate treatment without any side effects.  Her exam today is negative.  We reviewed her mammogram and breast MRI from 1/24/2022 which were negative, both BI-RADS 1.  I will see her back in 6 months for follow-up as she continues on raloxifene.  2. Family history of colon cancer:  · Patient does have a family history of colon cancer in her maternal grandfather at age 74  · The patient does continue with routine colonoscopy, last performed in July 2017 anticipating a 5-year interval follow-up.  · I discussed with the patient today that her 5-year screening colonoscopy will be due in July 2022.  She is going to contact Dr. Bloom's office who performed her last colonoscopy.  3. Epilepsy:  · Patient does continue  on Keppra, last seizure in 1990  4. Recent onset hypertension  · Patient has not been started on any treatment for hypertension.  Blood pressure today is elevated at 149/78.  I encouraged her to check her blood pressure at home and record a log for her PCP.  5. Chronic intermittent vertigo  · Patient has had intermittent chronic issues with vertigo, symptoms improved following vestibular maneuvers.     Plan:  1. Continue chemoprevention with raloxifene 60 mg daily.   2. The patient will contact Dr. Bloom's office to arrange her 5-year interval follow-up screening colonoscopy due in July 2017  3. MD visit in 6 months with CBC, CMP

## 2022-02-09 ENCOUNTER — OFFICE VISIT (OUTPATIENT)
Dept: ONCOLOGY | Facility: CLINIC | Age: 67
End: 2022-02-09

## 2022-02-09 ENCOUNTER — LAB (OUTPATIENT)
Dept: LAB | Facility: HOSPITAL | Age: 67
End: 2022-02-09

## 2022-02-09 VITALS
HEART RATE: 92 BPM | BODY MASS INDEX: 24.8 KG/M2 | RESPIRATION RATE: 17 BRPM | WEIGHT: 154.3 LBS | DIASTOLIC BLOOD PRESSURE: 78 MMHG | OXYGEN SATURATION: 100 % | HEIGHT: 66 IN | TEMPERATURE: 97.5 F | SYSTOLIC BLOOD PRESSURE: 149 MMHG

## 2022-02-09 DIAGNOSIS — D05.11 DUCTAL CARCINOMA IN SITU (DCIS) OF RIGHT BREAST: ICD-10-CM

## 2022-02-09 DIAGNOSIS — D05.11 DUCTAL CARCINOMA IN SITU (DCIS) OF RIGHT BREAST: Primary | ICD-10-CM

## 2022-02-09 LAB
ALBUMIN SERPL-MCNC: 4.2 G/DL (ref 3.5–5.2)
ALBUMIN/GLOB SERPL: 1.5 G/DL (ref 1.1–2.4)
ALP SERPL-CCNC: 64 U/L (ref 38–116)
ALT SERPL W P-5'-P-CCNC: 15 U/L (ref 0–33)
ANION GAP SERPL CALCULATED.3IONS-SCNC: 10.6 MMOL/L (ref 5–15)
AST SERPL-CCNC: 19 U/L (ref 0–32)
BASOPHILS # BLD AUTO: 0.04 10*3/MM3 (ref 0–0.2)
BASOPHILS NFR BLD AUTO: 0.5 % (ref 0–1.5)
BILIRUB SERPL-MCNC: 0.5 MG/DL (ref 0.2–1.2)
BUN SERPL-MCNC: 19 MG/DL (ref 6–20)
BUN/CREAT SERPL: 25.7 (ref 7.3–30)
CALCIUM SPEC-SCNC: 9.3 MG/DL (ref 8.5–10.2)
CHLORIDE SERPL-SCNC: 102 MMOL/L (ref 98–107)
CO2 SERPL-SCNC: 27.4 MMOL/L (ref 22–29)
CREAT SERPL-MCNC: 0.74 MG/DL (ref 0.6–1.1)
DEPRECATED RDW RBC AUTO: 48.5 FL (ref 37–54)
EOSINOPHIL # BLD AUTO: 0.12 10*3/MM3 (ref 0–0.4)
EOSINOPHIL NFR BLD AUTO: 1.6 % (ref 0.3–6.2)
ERYTHROCYTE [DISTWIDTH] IN BLOOD BY AUTOMATED COUNT: 14.3 % (ref 12.3–15.4)
GFR SERPL CREATININE-BSD FRML MDRD: 79 ML/MIN/1.73
GLOBULIN UR ELPH-MCNC: 2.8 GM/DL (ref 1.8–3.5)
GLUCOSE SERPL-MCNC: 97 MG/DL (ref 74–124)
HCT VFR BLD AUTO: 38.9 % (ref 34–46.6)
HGB BLD-MCNC: 12.9 G/DL (ref 12–15.9)
IMM GRANULOCYTES # BLD AUTO: 0.02 10*3/MM3 (ref 0–0.05)
IMM GRANULOCYTES NFR BLD AUTO: 0.3 % (ref 0–0.5)
LYMPHOCYTES # BLD AUTO: 2.14 10*3/MM3 (ref 0.7–3.1)
LYMPHOCYTES NFR BLD AUTO: 28.5 % (ref 19.6–45.3)
MCH RBC QN AUTO: 30.6 PG (ref 26.6–33)
MCHC RBC AUTO-ENTMCNC: 33.2 G/DL (ref 31.5–35.7)
MCV RBC AUTO: 92.4 FL (ref 79–97)
MONOCYTES # BLD AUTO: 0.61 10*3/MM3 (ref 0.1–0.9)
MONOCYTES NFR BLD AUTO: 8.1 % (ref 5–12)
NEUTROPHILS NFR BLD AUTO: 4.58 10*3/MM3 (ref 1.7–7)
NEUTROPHILS NFR BLD AUTO: 61 % (ref 42.7–76)
NRBC BLD AUTO-RTO: 0 /100 WBC (ref 0–0.2)
PLATELET # BLD AUTO: 231 10*3/MM3 (ref 140–450)
PMV BLD AUTO: 9.7 FL (ref 6–12)
POTASSIUM SERPL-SCNC: 4.3 MMOL/L (ref 3.5–4.7)
PROT SERPL-MCNC: 7 G/DL (ref 6.3–8)
RBC # BLD AUTO: 4.21 10*6/MM3 (ref 3.77–5.28)
SODIUM SERPL-SCNC: 140 MMOL/L (ref 134–145)
WBC NRBC COR # BLD: 7.51 10*3/MM3 (ref 3.4–10.8)

## 2022-02-09 PROCEDURE — 99214 OFFICE O/P EST MOD 30 MIN: CPT | Performed by: INTERNAL MEDICINE

## 2022-02-09 PROCEDURE — 80053 COMPREHEN METABOLIC PANEL: CPT

## 2022-02-09 PROCEDURE — 85025 COMPLETE CBC W/AUTO DIFF WBC: CPT

## 2022-02-09 PROCEDURE — 36415 COLL VENOUS BLD VENIPUNCTURE: CPT

## 2022-04-21 RX ORDER — RALOXIFENE HYDROCHLORIDE 60 MG/1
TABLET, FILM COATED ORAL
Qty: 90 TABLET | Refills: 1 | Status: SHIPPED | OUTPATIENT
Start: 2022-04-21 | End: 2022-10-14

## 2022-07-28 NOTE — PROGRESS NOTES
"Chief Complaint  Right breast DCIS    Subjective        History of Present Illness  Patient returns today in follow-up continuing on chemoprevention with raloxifene 60 mg daily (initiated 3/26/2020) x5 years.  The patient today reports that she has been doing fairly well since her last visit.  She is not experiencing any significant side effects from raloxifene.  She was having some previous difficulty with vertigo however this resolved after physical therapy with vestibular maneuvers.  She did undergo colonoscopy at a 5-year interval 7/6/2022 in the [facility] with 1 hyperplastic polyp removed.  Follow-up planned at 5 years.      Objective   Vital Signs:   /72   Pulse 64   Temp 97.1 °F (36.2 °C) (Temporal)   Resp 16   Ht 167.6 cm (65.98\")   Wt 71 kg (156 lb 8 oz)   SpO2 100%   BMI 25.27 kg/m²     Physical Exam  Constitutional:       Appearance: She is well-developed.   Eyes:      Conjunctiva/sclera: Conjunctivae normal.   Neck:      Thyroid: No thyromegaly.   Cardiovascular:      Rate and Rhythm: Normal rate and regular rhythm.      Heart sounds: No murmur heard.    No friction rub. No gallop.   Pulmonary:      Effort: No respiratory distress.      Breath sounds: Normal breath sounds.      Comments: Status post right mastectomy with implant reconstruction, no masses or abnormalities palpated.  Left breast without masses or abnormalities palpated.  Chest:   Breasts:      Right: No supraclavicular adenopathy.      Left: No supraclavicular adenopathy.       Abdominal:      General: Bowel sounds are normal. There is no distension.      Palpations: Abdomen is soft.      Tenderness: There is no abdominal tenderness.   Lymphadenopathy:      Head:      Right side of head: No submandibular adenopathy.      Cervical: No cervical adenopathy.      Upper Body:      Right upper body: No supraclavicular adenopathy.      Left upper body: No supraclavicular adenopathy.   Skin:     General: Skin is warm and dry.      " "Findings: No rash.   Neurological:      Mental Status: She is alert and oriented to person, place, and time.      Cranial Nerves: No cranial nerve deficit.      Motor: No abnormal muscle tone.      Deep Tendon Reflexes: Reflexes normal.   Psychiatric:         Behavior: Behavior normal.     Patient was examined today, unchanged from above      Result Review : Reviewed CBC, CMP from today       Assessment and Plan  1. Right breast DCIS ([id number]):  · Menarche at age 15, spontaneous menopause at age 45-50.  Uterus is intact.  A1.  She received progesterone to \"stay pregnant\" in the past, no other hormonal treatment.  · Family history is significant for a mother with breast cancer (DCIS) at age 79, father with prostate cancer in his 70s, sister with breast cancer at age 64 (patient in our practice), and a paternal uncle with glioblastoma at age 84, maternal grandfather with colon cancer at age 74, maternal first cousin with an abdominal sarcoma at age 25-30.  · The patient has undergone routine previous mammograms, reports that she has had frequent six-month follow-up studies performed due to inability to accurately assess her breasts because of increased density.  She has never had a prior breast biopsy.    · Mammogram on 2020 showed new calcifications right breast measuring 6 mm.  By ultrasound there was a hypoechoic area in the 8 o'clock position.    · Biopsy 2020 of the right breast with finding of DCIS, high-grade with comedonecrosis, associated calcifications, solid, cribriform, and comedo growth patterns, largest area measuring 6 mm, ER negative, NE negative.  Biopsy performed at [facility] and reviewed at [facility] with agreement.    · The patient did undergo genetic testing with INVITAE panel test negative 2020.    · Breast MRI 2020 with finding of a 4.4 cm abnormality in the 8 o'clock position of the right breast with clip in place from prior biopsy.    · Given the size of the lesion, " patient was felt not to be a candidate for lumpectomy.    · Right mastectomy with sentinel lymph node biopsy and tissue expander placement on 3/19/2020 with Dr. Hodges and Dr Gore.  Pathology showed high-grade DCIS, solid and cribriform with associated comedonecrosis.  Margins were negative.  The area of involvement with DCIS measured at least 4 cm.  [place] lymph nodes negative x3.  · Given patient's family history and personal history of DCIS, estimated risk of contralateral DCIS or invasive cancer felt to be in the 6-10% range, recommended chemoprevention for risk reduction.  Patient with underlying osteopenia and intact uterus.  Decision to pursue raloxifene 60 mg daily x5 years, initiated 3/26/2020.  · Due to poor visualization of prior DCIS on mammogram as well as issues with breast density and difficulty with mammographic evaluation in the past, plan annual follow-up left mammogram and MRI.  · Status post tissue expander removal and implant placement on the right side as well as left breast reduction with plastic surgery on 7/20/2020.  Pathology from left breast reduction negative for malignancy.  · Patient returns today in follow-up continuing on chemoprevention with raloxifene 60 mg daily.  Patient continues to tolerate raloxifene well with no significant side effects.  Exam today is negative.  We will go ahead and schedule upcoming left breast mammogram and MRI in January and I will see her in 6 months for further follow-up.  2. Family history of colon cancer:  · Patient does have a family history of colon cancer in her maternal grandfather at age 74  · The patient does continue with routine colonoscopy, last performed in July 2017 anticipating a 5-year interval follow-up.  · Patient did undergo follow-up colonoscopy on 7/6/2022 with 1 hyperplastic polyp removed, plan follow-up in 5 years again.  3. Epilepsy:  · Patient does continue on Keppra, last seizure in 1990  4. Chronic intermittent  vertigo  · Patient has had intermittent chronic issues with vertigo, symptoms improved following vestibular maneuvers.     Plan:  1. Continue chemoprevention with raloxifene 60 mg daily.   2. Schedule annual left mammogram and breast MRI in January 2023  3. MD visit in 6 months with CBC, CMP

## 2022-07-29 ENCOUNTER — OFFICE VISIT (OUTPATIENT)
Dept: ONCOLOGY | Facility: CLINIC | Age: 67
End: 2022-07-29

## 2022-07-29 ENCOUNTER — LAB (OUTPATIENT)
Dept: LAB | Facility: HOSPITAL | Age: 67
End: 2022-07-29

## 2022-07-29 VITALS
DIASTOLIC BLOOD PRESSURE: 72 MMHG | TEMPERATURE: 97.1 F | SYSTOLIC BLOOD PRESSURE: 131 MMHG | HEART RATE: 64 BPM | HEIGHT: 66 IN | RESPIRATION RATE: 16 BRPM | WEIGHT: 156.5 LBS | BODY MASS INDEX: 25.15 KG/M2 | OXYGEN SATURATION: 100 %

## 2022-07-29 DIAGNOSIS — D05.11 DUCTAL CARCINOMA IN SITU (DCIS) OF RIGHT BREAST: ICD-10-CM

## 2022-07-29 DIAGNOSIS — D05.11 DUCTAL CARCINOMA IN SITU (DCIS) OF RIGHT BREAST: Primary | ICD-10-CM

## 2022-07-29 LAB
ALBUMIN SERPL-MCNC: 4.5 G/DL (ref 3.5–5.2)
ALBUMIN/GLOB SERPL: 1.7 G/DL (ref 1.1–2.4)
ALP SERPL-CCNC: 63 U/L (ref 38–116)
ALT SERPL W P-5'-P-CCNC: 16 U/L (ref 0–33)
ANION GAP SERPL CALCULATED.3IONS-SCNC: 12.6 MMOL/L (ref 5–15)
AST SERPL-CCNC: 22 U/L (ref 0–32)
BASOPHILS # BLD AUTO: 0.04 10*3/MM3 (ref 0–0.2)
BASOPHILS NFR BLD AUTO: 0.5 % (ref 0–1.5)
BILIRUB SERPL-MCNC: 0.9 MG/DL (ref 0.2–1.2)
BUN SERPL-MCNC: 16 MG/DL (ref 6–20)
BUN/CREAT SERPL: 22.5 (ref 7.3–30)
CALCIUM SPEC-SCNC: 10 MG/DL (ref 8.5–10.2)
CHLORIDE SERPL-SCNC: 104 MMOL/L (ref 98–107)
CO2 SERPL-SCNC: 24.4 MMOL/L (ref 22–29)
CREAT SERPL-MCNC: 0.71 MG/DL (ref 0.6–1.1)
DEPRECATED RDW RBC AUTO: 46.9 FL (ref 37–54)
EGFRCR SERPLBLD CKD-EPI 2021: 94.5 ML/MIN/1.73
EOSINOPHIL # BLD AUTO: 0.11 10*3/MM3 (ref 0–0.4)
EOSINOPHIL NFR BLD AUTO: 1.5 % (ref 0.3–6.2)
ERYTHROCYTE [DISTWIDTH] IN BLOOD BY AUTOMATED COUNT: 13.9 % (ref 12.3–15.4)
GLOBULIN UR ELPH-MCNC: 2.7 GM/DL (ref 1.8–3.5)
GLUCOSE SERPL-MCNC: 93 MG/DL (ref 74–124)
HCT VFR BLD AUTO: 40.5 % (ref 34–46.6)
HGB BLD-MCNC: 13.6 G/DL (ref 12–15.9)
IMM GRANULOCYTES # BLD AUTO: 0.02 10*3/MM3 (ref 0–0.05)
IMM GRANULOCYTES NFR BLD AUTO: 0.3 % (ref 0–0.5)
LYMPHOCYTES # BLD AUTO: 2.39 10*3/MM3 (ref 0.7–3.1)
LYMPHOCYTES NFR BLD AUTO: 32.1 % (ref 19.6–45.3)
MCH RBC QN AUTO: 30.9 PG (ref 26.6–33)
MCHC RBC AUTO-ENTMCNC: 33.6 G/DL (ref 31.5–35.7)
MCV RBC AUTO: 92 FL (ref 79–97)
MONOCYTES # BLD AUTO: 0.62 10*3/MM3 (ref 0.1–0.9)
MONOCYTES NFR BLD AUTO: 8.3 % (ref 5–12)
NEUTROPHILS NFR BLD AUTO: 4.27 10*3/MM3 (ref 1.7–7)
NEUTROPHILS NFR BLD AUTO: 57.3 % (ref 42.7–76)
NRBC BLD AUTO-RTO: 0 /100 WBC (ref 0–0.2)
PLATELET # BLD AUTO: 235 10*3/MM3 (ref 140–450)
PMV BLD AUTO: 9.4 FL (ref 6–12)
POTASSIUM SERPL-SCNC: 4.3 MMOL/L (ref 3.5–4.7)
PROT SERPL-MCNC: 7.2 G/DL (ref 6.3–8)
RBC # BLD AUTO: 4.4 10*6/MM3 (ref 3.77–5.28)
SODIUM SERPL-SCNC: 141 MMOL/L (ref 134–145)
WBC NRBC COR # BLD: 7.45 10*3/MM3 (ref 3.4–10.8)

## 2022-07-29 PROCEDURE — 80053 COMPREHEN METABOLIC PANEL: CPT

## 2022-07-29 PROCEDURE — 99213 OFFICE O/P EST LOW 20 MIN: CPT | Performed by: INTERNAL MEDICINE

## 2022-07-29 PROCEDURE — 36415 COLL VENOUS BLD VENIPUNCTURE: CPT

## 2022-07-29 PROCEDURE — 85025 COMPLETE CBC W/AUTO DIFF WBC: CPT

## 2022-10-14 RX ORDER — RALOXIFENE HYDROCHLORIDE 60 MG/1
TABLET, FILM COATED ORAL
Qty: 90 TABLET | Refills: 1 | Status: SHIPPED | OUTPATIENT
Start: 2022-10-14 | End: 2023-03-27

## 2023-01-26 ENCOUNTER — APPOINTMENT (OUTPATIENT)
Dept: MRI IMAGING | Facility: HOSPITAL | Age: 68
End: 2023-01-26
Payer: COMMERCIAL

## 2023-01-26 ENCOUNTER — HOSPITAL ENCOUNTER (OUTPATIENT)
Dept: MAMMOGRAPHY | Facility: HOSPITAL | Age: 68
Discharge: HOME | End: 2023-01-26
Admitting: INTERNAL MEDICINE
Payer: COMMERCIAL

## 2023-01-26 DIAGNOSIS — D05.11 DUCTAL CARCINOMA IN SITU (DCIS) OF RIGHT BREAST: ICD-10-CM

## 2023-01-26 PROCEDURE — 77067 SCR MAMMO BI INCL CAD: CPT

## 2023-01-26 PROCEDURE — 77063 BREAST TOMOSYNTHESIS BI: CPT

## 2023-01-27 ENCOUNTER — HOSPITAL ENCOUNTER (OUTPATIENT)
Dept: MRI IMAGING | Facility: HOSPITAL | Age: 68
Discharge: HOME | End: 2023-01-27
Admitting: INTERNAL MEDICINE
Payer: COMMERCIAL

## 2023-01-27 DIAGNOSIS — D05.11 DUCTAL CARCINOMA IN SITU (DCIS) OF RIGHT BREAST: ICD-10-CM

## 2023-01-27 PROCEDURE — 82565 ASSAY OF CREATININE: CPT

## 2023-01-27 PROCEDURE — A9577 INJ MULTIHANCE: HCPCS | Performed by: INTERNAL MEDICINE

## 2023-01-27 PROCEDURE — 0 GADOBENATE DIMEGLUMINE 529 MG/ML SOLUTION: Performed by: INTERNAL MEDICINE

## 2023-01-27 PROCEDURE — 77049 MRI BREAST C-+ W/CAD BI: CPT

## 2023-01-27 RX ADMIN — GADOBENATE DIMEGLUMINE 14 ML: 529 INJECTION, SOLUTION INTRAVENOUS at 07:09

## 2023-01-29 LAB — CREAT BLDA-MCNC: 0.6 MG/DL (ref 0.6–1.3)

## 2023-01-30 NOTE — PROGRESS NOTES
"Chief Complaint  Right breast DCIS    Subjective        History of Present Illness  Patient returns today in follow-up continuing on chemoprevention with raloxifene 60 mg daily (initiated 3/26/2020) x5 years.  The patient has laboratory studies in addition to annual mammogram and breast MRI to review.  She has continued to tolerate raloxifene well without any side effects.  She has no complaints today, has not experienced any significant medical issues recently.      Objective   Vital Signs:   /77   Pulse 86   Temp 96.9 °F (36.1 °C) (Temporal)   Resp 16   Ht 167.6 cm (65.98\")   Wt 71.9 kg (158 lb 9.6 oz)   SpO2 98%   BMI 25.61 kg/m²     Physical Exam  Constitutional:       Appearance: She is well-developed.   Eyes:      Conjunctiva/sclera: Conjunctivae normal.   Neck:      Thyroid: No thyromegaly.   Cardiovascular:      Rate and Rhythm: Normal rate and regular rhythm.      Heart sounds: No murmur heard.    No friction rub. No gallop.   Pulmonary:      Effort: No respiratory distress.      Breath sounds: Normal breath sounds.      Comments: Status post right mastectomy with implant reconstruction, no masses or abnormalities palpated.  Left breast without masses or abnormalities palpated.  Abdominal:      General: Bowel sounds are normal. There is no distension.      Palpations: Abdomen is soft.      Tenderness: There is no abdominal tenderness.   Lymphadenopathy:      Head:      Right side of head: No submandibular adenopathy.      Cervical: No cervical adenopathy.      Upper Body:      Right upper body: No supraclavicular adenopathy.      Left upper body: No supraclavicular adenopathy.   Skin:     General: Skin is warm and dry.      Findings: No rash.   Neurological:      Mental Status: She is alert and oriented to person, place, and time.      Cranial Nerves: No cranial nerve deficit.      Motor: No abnormal muscle tone.      Deep Tendon Reflexes: Reflexes normal.   Psychiatric:         Behavior: " "Behavior normal.     Patient was examined today, unchanged from above      Result Review : Reviewed CBC, CMP from today.  Reviewed mammogram from 2023 and breast MRI from 2023.       Assessment and Plan  1. Right breast DCIS ([id number]):  · Menarche at age 15, spontaneous menopause at age 45-50.  Uterus is intact.  A1.  She received progesterone to \"stay pregnant\" in the past, no other hormonal treatment.  · Family history is significant for a mother with breast cancer (DCIS) at age 79, father with prostate cancer in his 70s, sister with breast cancer at age 64 (patient in our practice), and a paternal uncle with glioblastoma at age 84, maternal grandfather with colon cancer at age 74, maternal first cousin with an abdominal sarcoma at age 25-30.  · The patient has undergone routine previous mammograms, reports that she has had frequent six-month follow-up studies performed due to inability to accurately assess her breasts because of increased density.  She has never had a prior breast biopsy.    · Mammogram on 2020 showed new calcifications right breast measuring 6 mm.  By ultrasound there was a hypoechoic area in the 8 o'clock position.    · Biopsy 2020 of the right breast with finding of DCIS, high-grade with comedonecrosis, associated calcifications, solid, cribriform, and comedo growth patterns, largest area measuring 6 mm, ER negative, [place] negative.  Biopsy performed at [facility] and reviewed at [facility] with agreement.    · The patient did undergo genetic testing with INVITAE panel test negative 2020.    · Breast MRI 2020 with finding of a 4.4 cm abnormality in the 8 o'clock position of the right breast with clip in place from prior biopsy.    · Given the size of the lesion, patient was felt not to be a candidate for lumpectomy.    · Right mastectomy with sentinel lymph node biopsy and tissue expander placement on 3/19/2020 with Dr. Hodges and Dr Gore.  Pathology showed " high-grade DCIS, solid and cribriform with associated comedonecrosis.  Margins were negative.  The area of involvement with DCIS measured at least 4 cm.  [place] lymph nodes negative x3.  · Given patient's family history and personal history of DCIS, estimated risk of contralateral DCIS or invasive cancer felt to be in the 6-10% range, recommended chemoprevention for risk reduction.  Patient with underlying osteopenia and intact uterus.  Decision to pursue raloxifene 60 mg daily x5 years, initiated 3/26/2020.  · Due to poor visualization of prior DCIS on mammogram as well as issues with breast density and difficulty with mammographic evaluation in the past, plan annual follow-up left mammogram and MRI.  · Status post tissue expander removal and implant placement on the right side as well as left breast reduction with plastic surgery on 7/20/2020.  Pathology from left breast reduction negative for malignancy.  · Patient returns today in follow-up continuing on chemoprevention with raloxifene 60 mg daily.  She continues to tolerate raloxifene without any significant side effects.  Her exam today is negative.  Mammogram on 1/26/2023 was negative, BI-RADS 1 and breast MRI on 1/27/2023 was negative, BI-RADS 2.  We discussed ongoing monitoring with mammogram and MRI.  We did discuss in the future possibly changing to 6-month alternating studies and she is interested to do this.  We will discuss this at her next visit and likely delay her MRI by 6 months next time.  I will see her back in 6 months for follow-up  2. Family history of colon cancer:  · Patient does have a family history of colon cancer in her maternal grandfather at age 74  · The patient does continue with routine colonoscopy, last performed in July 2017 anticipating a 5-year interval follow-up.  · Patient did undergo follow-up colonoscopy on 7/6/2022 with 1 hyperplastic polyp removed, plan follow-up in 5 years again.  3. Epilepsy:  · Patient does continue  on Keppra, last seizure in 1990  4. Chronic intermittent vertigo  · Patient has had intermittent chronic issues with vertigo.     Plan:  1. Continue chemoprevention with raloxifene 60 mg daily.   2. MD visit in 6 months with CBC, CMP

## 2023-01-31 ENCOUNTER — LAB (OUTPATIENT)
Dept: LAB | Facility: HOSPITAL | Age: 68
End: 2023-01-31
Payer: COMMERCIAL

## 2023-01-31 ENCOUNTER — OFFICE VISIT (OUTPATIENT)
Dept: ONCOLOGY | Facility: CLINIC | Age: 68
End: 2023-01-31
Payer: COMMERCIAL

## 2023-01-31 VITALS
HEART RATE: 86 BPM | SYSTOLIC BLOOD PRESSURE: 127 MMHG | RESPIRATION RATE: 16 BRPM | BODY MASS INDEX: 25.49 KG/M2 | DIASTOLIC BLOOD PRESSURE: 77 MMHG | TEMPERATURE: 96.9 F | WEIGHT: 158.6 LBS | HEIGHT: 66 IN | OXYGEN SATURATION: 98 %

## 2023-01-31 DIAGNOSIS — D05.11 DUCTAL CARCINOMA IN SITU (DCIS) OF RIGHT BREAST: ICD-10-CM

## 2023-01-31 DIAGNOSIS — D05.11 DUCTAL CARCINOMA IN SITU (DCIS) OF RIGHT BREAST: Primary | ICD-10-CM

## 2023-01-31 LAB
ALBUMIN SERPL-MCNC: 4.2 G/DL (ref 3.5–5.2)
ALBUMIN/GLOB SERPL: 1.6 G/DL (ref 1.1–2.4)
ALP SERPL-CCNC: 72 U/L (ref 38–116)
ALT SERPL W P-5'-P-CCNC: 14 U/L (ref 0–33)
ANION GAP SERPL CALCULATED.3IONS-SCNC: 10.4 MMOL/L (ref 5–15)
AST SERPL-CCNC: 19 U/L (ref 0–32)
BASOPHILS # BLD AUTO: 0.04 10*3/MM3 (ref 0–0.2)
BASOPHILS NFR BLD AUTO: 0.6 % (ref 0–1.5)
BILIRUB SERPL-MCNC: 0.5 MG/DL (ref 0.2–1.2)
BUN SERPL-MCNC: 18 MG/DL (ref 6–20)
BUN/CREAT SERPL: 25.4 (ref 7.3–30)
CALCIUM SPEC-SCNC: 9.4 MG/DL (ref 8.5–10.2)
CHLORIDE SERPL-SCNC: 104 MMOL/L (ref 98–107)
CO2 SERPL-SCNC: 26.6 MMOL/L (ref 22–29)
CREAT SERPL-MCNC: 0.71 MG/DL (ref 0.6–1.1)
DEPRECATED RDW RBC AUTO: 46.5 FL (ref 37–54)
EGFRCR SERPLBLD CKD-EPI 2021: 93.9 ML/MIN/1.73
EOSINOPHIL # BLD AUTO: 0.17 10*3/MM3 (ref 0–0.4)
EOSINOPHIL NFR BLD AUTO: 2.6 % (ref 0.3–6.2)
ERYTHROCYTE [DISTWIDTH] IN BLOOD BY AUTOMATED COUNT: 13.9 % (ref 12.3–15.4)
GLOBULIN UR ELPH-MCNC: 2.7 GM/DL (ref 1.8–3.5)
GLUCOSE SERPL-MCNC: 122 MG/DL (ref 74–124)
HCT VFR BLD AUTO: 39.7 % (ref 34–46.6)
HGB BLD-MCNC: 13.3 G/DL (ref 12–15.9)
IMM GRANULOCYTES # BLD AUTO: 0.02 10*3/MM3 (ref 0–0.05)
IMM GRANULOCYTES NFR BLD AUTO: 0.3 % (ref 0–0.5)
LYMPHOCYTES # BLD AUTO: 2.42 10*3/MM3 (ref 0.7–3.1)
LYMPHOCYTES NFR BLD AUTO: 36.6 % (ref 19.6–45.3)
MCH RBC QN AUTO: 30.6 PG (ref 26.6–33)
MCHC RBC AUTO-ENTMCNC: 33.5 G/DL (ref 31.5–35.7)
MCV RBC AUTO: 91.5 FL (ref 79–97)
MONOCYTES # BLD AUTO: 0.58 10*3/MM3 (ref 0.1–0.9)
MONOCYTES NFR BLD AUTO: 8.8 % (ref 5–12)
NEUTROPHILS NFR BLD AUTO: 3.38 10*3/MM3 (ref 1.7–7)
NEUTROPHILS NFR BLD AUTO: 51.1 % (ref 42.7–76)
NRBC BLD AUTO-RTO: 0 /100 WBC (ref 0–0.2)
PLATELET # BLD AUTO: 242 10*3/MM3 (ref 140–450)
PMV BLD AUTO: 9.3 FL (ref 6–12)
POTASSIUM SERPL-SCNC: 3.9 MMOL/L (ref 3.5–4.7)
PROT SERPL-MCNC: 6.9 G/DL (ref 6.3–8)
RBC # BLD AUTO: 4.34 10*6/MM3 (ref 3.77–5.28)
SODIUM SERPL-SCNC: 141 MMOL/L (ref 134–145)
WBC NRBC COR # BLD: 6.61 10*3/MM3 (ref 3.4–10.8)

## 2023-01-31 PROCEDURE — 36415 COLL VENOUS BLD VENIPUNCTURE: CPT

## 2023-01-31 PROCEDURE — 80053 COMPREHEN METABOLIC PANEL: CPT

## 2023-01-31 PROCEDURE — 85025 COMPLETE CBC W/AUTO DIFF WBC: CPT

## 2023-01-31 PROCEDURE — 99214 OFFICE O/P EST MOD 30 MIN: CPT | Performed by: INTERNAL MEDICINE

## 2023-03-27 RX ORDER — RALOXIFENE HYDROCHLORIDE 60 MG/1
TABLET, FILM COATED ORAL
Qty: 90 TABLET | Refills: 1 | Status: SHIPPED | OUTPATIENT
Start: 2023-03-27

## 2023-07-24 NOTE — PROGRESS NOTES
"Chief Complaint  Right breast DCIS    Subjective        History of Present Illness  Patient returns today in follow-up continuing on chemoprevention with raloxifene 60 mg daily (initiated 3/26/2020) x5 years.  Patient has laboratory studies to review today.  She has overall done quite well since her last visit here.  She was in to see her gynecologist recently and was complaining of some mild vaginal dryness and they had discussed potential use of topical vaginal estrogen.  She reports that there was also discussion regarding use of raloxifene beyond 5 years for her osteopenia.      Objective   Vital Signs:   /76   Pulse 68   Temp 97.1 °F (36.2 °C) (Temporal)   Resp 16   Ht 167.6 cm (65.98\")   Wt 73.4 kg (161 lb 12.8 oz)   SpO2 99%   BMI 26.13 kg/m²     Physical Exam  Constitutional:       Appearance: She is well-developed.   Eyes:      Conjunctiva/sclera: Conjunctivae normal.   Neck:      Thyroid: No thyromegaly.   Cardiovascular:      Rate and Rhythm: Normal rate and regular rhythm.      Heart sounds: No murmur heard.    No friction rub. No gallop.   Pulmonary:      Effort: No respiratory distress.      Breath sounds: Normal breath sounds.      Comments: Status post right mastectomy with implant reconstruction, no masses or abnormalities palpated.  Left breast without masses or abnormalities palpated.  Abdominal:      General: Bowel sounds are normal. There is no distension.      Palpations: Abdomen is soft.      Tenderness: There is no abdominal tenderness.   Musculoskeletal:      Comments: Trace bilateral lower extremity edema, left slightly greater than right with faint stasis changes and scattered varicosities   Lymphadenopathy:      Head:      Right side of head: No submandibular adenopathy.      Cervical: No cervical adenopathy.      Upper Body:      Right upper body: No supraclavicular adenopathy.      Left upper body: No supraclavicular adenopathy.   Skin:     General: Skin is warm and dry.    " "  Findings: No rash.   Neurological:      Mental Status: She is alert and oriented to person, place, and time.      Cranial Nerves: No cranial nerve deficit.      Motor: No abnormal muscle tone.      Deep Tendon Reflexes: Reflexes normal.   Psychiatric:         Behavior: Behavior normal.     Patient was examined today, unchanged from above      Result Review : Reviewed CBC, CMP from today.       Assessment and Plan    *Right breast DCIS ([id number]):  Menarche at age 15, spontaneous menopause at age 45-50.  Uterus is intact.  A1.  She received progesterone to \"stay pregnant\" in the past, no other hormonal treatment.  Family history is significant for a mother with breast cancer (DCIS) at age 79, father with prostate cancer in his 70s, sister with breast cancer at age 64 (patient in our practice), and a paternal uncle with glioblastoma at age 84, maternal grandfather with colon cancer at age 74, maternal first cousin with an abdominal sarcoma at age 25-30.  The patient has undergone routine previous mammograms, reports that she has had frequent six-month follow-up studies performed due to inability to accurately assess her breasts because of increased density.  She has never had a prior breast biopsy.    Mammogram on 2020 showed new calcifications right breast measuring 6 mm.  By ultrasound there was a hypoechoic area in the 8 o'clock position.    Biopsy 2020 of the right breast with finding of DCIS, high-grade with comedonecrosis, associated calcifications, solid, cribriform, and comedo growth patterns, largest area measuring 6 mm, ER negative, RI negative.  Biopsy performed at [facility] and reviewed at [facility] with agreement.    The patient did undergo genetic testing with INVITAE panel test negative 2020.    Breast MRI 2020 with finding of a 4.4 cm abnormality in the 8 o'clock position of the right breast with clip in place from prior biopsy.    Given the size of the lesion, patient was felt " not to be a candidate for lumpectomy.    Right mastectomy with sentinel lymph node biopsy and tissue expander placement on 3/19/2020 with Dr. Hodges and Dr Gore.  Pathology showed high-grade DCIS, solid and cribriform with associated comedonecrosis.  Margins were negative.  The area of involvement with DCIS measured at least 4 cm.  [place] lymph nodes negative x3.  Given patient's family history and personal history of DCIS, estimated risk of contralateral DCIS or invasive cancer felt to be in the 6-10% range, recommended chemoprevention for risk reduction.  Patient with underlying osteopenia and intact uterus.  Decision to pursue raloxifene 60 mg daily x5 years, initiated 3/26/2020.  Due to poor visualization of prior DCIS on mammogram as well as issues with breast density and difficulty with mammographic evaluation in the past, plan annual follow-up left mammogram and MRI.  Status post tissue expander removal and implant placement on the right side as well as left breast reduction with plastic surgery on 7/20/2020.  Pathology from left breast reduction negative for malignancy.  Patient returns today in follow-up continuing on chemoprevention with raloxifene 60 mg daily.  The patient is tolerating this reasonably well although has experienced some degree of vaginal dryness which she reports as mild in nature.  She was seen by her gynecologist and they discussed use of topical vaginal estrogen.  We discussed the issue today.  There is some degree of systemic absorption of estrogen when administered in this manner.  It is uncertain the degree to which this might counteract effects of her chemoprevention treatment with raloxifene.  Nevertheless, she did not have an estrogen positive DCIS.  Therefore this is somewhat of a gray area and I would leave the decision to the patient in regards to the severity of her symptoms and potential risks of minimizing chemopreventive effects of her raloxifene.  The patient does  have today some new slight bilateral lower extremity edema with slight asymmetry left greater than right.  She has some faint erythema suggestive of venous stasis with some associated varicosities.  Since she is on raloxifene, it would be more prudent to go ahead and check Dopplers to rule out DVT and the patient agrees.  She will have this performed tomorrow.  I did suggest that she continue wearing compression stockings which she has not been wearing as often in the warm humid weather.  Finally, we discussed her ongoing imaging studies.  We have been performing annual mammogram and MRI studies however have been performing studies around the same time each year.  When she was here last we had discussed trying to stagger her studies 6 months apart and she agrees.  Therefore we will have her left mammogram performed in January and I will see her in 6 months and at that time we will order her MRI for July 2024.  Exam today was negative.    *Family history of colon cancer:  Patient does have a family history of colon cancer in her maternal grandfather at age 74  The patient does continue with routine colonoscopy, last performed in July 2017 anticipating a 5-year interval follow-up.  Patient did undergo follow-up colonoscopy on 7/6/2022 with 1 hyperplastic polyp removed, plan follow-up in 5 years again.    *Epilepsy:  Patient does continue on Keppra, last seizure in 1990    *Chronic intermittent vertigo  Patient has had intermittent chronic issues with vertigo.     *Bilateral lower extremity edema  Patient with increased lower extremity edema, trace on exam but with some asymmetry left greater than right.  There faint venous stasis changes noted and some varicosities, likely all related to venous insufficiency.  Given her treatment with raloxifene however we will go ahead and check bilateral lower extremity Dopplers tomorrow to rule out DVT.  Patient was encouraged to wear compression stockings on a more regular  basis.    *Osteopenia  Patient may experience some beneficial effects from raloxifene during her course of chemoprevention  I will defer any decision for continuation of raloxifene to her gynecologist beyond the 5-year timeframe.  No evidence to suggest that continuation of raloxifene beyond this timeframe would confer any additional benefit from a chemoprevention standpoint and there are better medications for treatment of osteopenia at that point.      Plan:  Continue chemoprevention with raloxifene 60 mg daily.   Bilateral lower extremity Doppler in a.m. to rule out DVT  Patient encouraged to wear bilateral lower extremity compression stockings more routinely  Patient will decide regarding use of topical vaginal estrogen from her gynecologist depending on the severity of her symptoms and potential to counteract chemopreventive effects of raloxifene  Schedule annual left mammogram in January 2023  We will plan to stagger surveillance breast MRI until July 2024  Return in 6 months for follow-up with CBC, CMP

## 2023-07-25 ENCOUNTER — LAB (OUTPATIENT)
Dept: LAB | Facility: HOSPITAL | Age: 68
End: 2023-07-25
Payer: COMMERCIAL

## 2023-07-25 ENCOUNTER — OFFICE VISIT (OUTPATIENT)
Dept: ONCOLOGY | Facility: CLINIC | Age: 68
End: 2023-07-25
Payer: COMMERCIAL

## 2023-07-25 VITALS
SYSTOLIC BLOOD PRESSURE: 125 MMHG | WEIGHT: 161.8 LBS | HEIGHT: 66 IN | DIASTOLIC BLOOD PRESSURE: 76 MMHG | BODY MASS INDEX: 26 KG/M2 | RESPIRATION RATE: 16 BRPM | HEART RATE: 68 BPM | OXYGEN SATURATION: 99 % | TEMPERATURE: 97.1 F

## 2023-07-25 DIAGNOSIS — D05.11 DUCTAL CARCINOMA IN SITU (DCIS) OF RIGHT BREAST: ICD-10-CM

## 2023-07-25 DIAGNOSIS — C50.811 MALIGNANT NEOPLASM OF OVERLAPPING SITES OF RIGHT BREAST IN FEMALE, ESTROGEN RECEPTOR NEGATIVE: Primary | ICD-10-CM

## 2023-07-25 DIAGNOSIS — R60.0 LOWER EXTREMITY EDEMA: ICD-10-CM

## 2023-07-25 DIAGNOSIS — Z17.1 MALIGNANT NEOPLASM OF OVERLAPPING SITES OF RIGHT BREAST IN FEMALE, ESTROGEN RECEPTOR NEGATIVE: Primary | ICD-10-CM

## 2023-07-25 LAB
ALBUMIN SERPL-MCNC: 4 G/DL (ref 3.5–5.2)
ALBUMIN/GLOB SERPL: 2.1 G/DL
ALP SERPL-CCNC: 64 U/L (ref 39–117)
ALT SERPL W P-5'-P-CCNC: 7 U/L (ref 1–33)
ANION GAP SERPL CALCULATED.3IONS-SCNC: 17.2 MMOL/L (ref 5–15)
AST SERPL-CCNC: 21 U/L (ref 1–32)
BASOPHILS # BLD AUTO: 0.04 10*3/MM3 (ref 0–0.2)
BASOPHILS NFR BLD AUTO: 0.6 % (ref 0–1.5)
BILIRUB SERPL-MCNC: 0.5 MG/DL (ref 0–1.2)
BUN SERPL-MCNC: 14 MG/DL (ref 8–23)
BUN/CREAT SERPL: 23 (ref 7–25)
CALCIUM SPEC-SCNC: 9.1 MG/DL (ref 8.6–10.5)
CHLORIDE SERPL-SCNC: 106 MMOL/L (ref 98–107)
CO2 SERPL-SCNC: 19.8 MMOL/L (ref 22–29)
CREAT SERPL-MCNC: 0.61 MG/DL (ref 0.6–1.1)
DEPRECATED RDW RBC AUTO: 47.8 FL (ref 37–54)
EGFRCR SERPLBLD CKD-EPI 2021: 98.7 ML/MIN/1.73
EOSINOPHIL # BLD AUTO: 0.17 10*3/MM3 (ref 0–0.4)
EOSINOPHIL NFR BLD AUTO: 2.6 % (ref 0.3–6.2)
ERYTHROCYTE [DISTWIDTH] IN BLOOD BY AUTOMATED COUNT: 14 % (ref 12.3–15.4)
GLOBULIN UR ELPH-MCNC: 1.9 GM/DL
GLUCOSE SERPL-MCNC: 110 MG/DL (ref 65–99)
HCT VFR BLD AUTO: 39.7 % (ref 34–46.6)
HGB BLD-MCNC: 13.3 G/DL (ref 12–15.9)
IMM GRANULOCYTES # BLD AUTO: 0.01 10*3/MM3 (ref 0–0.05)
IMM GRANULOCYTES NFR BLD AUTO: 0.2 % (ref 0–0.5)
LYMPHOCYTES # BLD AUTO: 2.23 10*3/MM3 (ref 0.7–3.1)
LYMPHOCYTES NFR BLD AUTO: 33.6 % (ref 19.6–45.3)
MCH RBC QN AUTO: 31.1 PG (ref 26.6–33)
MCHC RBC AUTO-ENTMCNC: 33.5 G/DL (ref 31.5–35.7)
MCV RBC AUTO: 93 FL (ref 79–97)
MONOCYTES # BLD AUTO: 0.64 10*3/MM3 (ref 0.1–0.9)
MONOCYTES NFR BLD AUTO: 9.6 % (ref 5–12)
NEUTROPHILS NFR BLD AUTO: 3.55 10*3/MM3 (ref 1.7–7)
NEUTROPHILS NFR BLD AUTO: 53.4 % (ref 42.7–76)
NRBC BLD AUTO-RTO: 0 /100 WBC (ref 0–0.2)
PLATELET # BLD AUTO: 221 10*3/MM3 (ref 140–450)
PMV BLD AUTO: 9.6 FL (ref 6–12)
POTASSIUM SERPL-SCNC: 4.2 MMOL/L (ref 3.5–5.2)
PROT SERPL-MCNC: 5.9 G/DL (ref 6–8.5)
RBC # BLD AUTO: 4.27 10*6/MM3 (ref 3.77–5.28)
SODIUM SERPL-SCNC: 143 MMOL/L (ref 136–145)
WBC NRBC COR # BLD: 6.64 10*3/MM3 (ref 3.4–10.8)

## 2023-07-25 PROCEDURE — 80053 COMPREHEN METABOLIC PANEL: CPT

## 2023-07-25 PROCEDURE — 99214 OFFICE O/P EST MOD 30 MIN: CPT | Performed by: INTERNAL MEDICINE

## 2023-07-25 PROCEDURE — 36415 COLL VENOUS BLD VENIPUNCTURE: CPT

## 2023-07-25 PROCEDURE — 85025 COMPLETE CBC W/AUTO DIFF WBC: CPT

## 2023-07-25 NOTE — LETTER
"July 25, 2023     Swapna Taylor MD  [address]  [address]  [place] [id number]    Patient: Honey Avila   YOB: 1955   Date of Visit: 7/25/2023     Dear Swapna Taylor MD:       Thank you for referring Honey Avila to me for evaluation. Below are the relevant portions of my assessment and plan of care.    If you have questions, please do not hesitate to call me. I look forward to following Honey along with you.         Sincerely,        Phong Bella MD        CC: No Recipients    Phong Bella Jr., MD  07/25/23 1740  Sign when Signing Visit  Chief Complaint  Right breast DCIS    Subjective        History of Present Illness  Patient returns today in follow-up continuing on chemoprevention with raloxifene 60 mg daily (initiated 3/26/2020) x5 years.  Patient has laboratory studies to review today.  She has overall done quite well since her last visit here.  She was in to see her gynecologist recently and was complaining of some mild vaginal dryness and they had discussed potential use of topical vaginal estrogen.  She reports that there was also discussion regarding use of raloxifene beyond 5 years for her osteopenia.      Objective   Vital Signs:   /76   Pulse 68   Temp 97.1 °F (36.2 °C) (Temporal)   Resp 16   Ht 167.6 cm (65.98\")   Wt 73.4 kg (161 lb 12.8 oz)   SpO2 99%   BMI 26.13 kg/m²     Physical Exam  Constitutional:       Appearance: She is well-developed.   Eyes:      Conjunctiva/sclera: Conjunctivae normal.   Neck:      Thyroid: No thyromegaly.   Cardiovascular:      Rate and Rhythm: Normal rate and regular rhythm.      Heart sounds: No murmur heard.    No friction rub. No gallop.   Pulmonary:      Effort: No respiratory distress.      Breath sounds: Normal breath sounds.      Comments: Status post right mastectomy with implant reconstruction, no masses or abnormalities palpated.  Left breast without masses or abnormalities palpated.  Abdominal:      " "General: Bowel sounds are normal. There is no distension.      Palpations: Abdomen is soft.      Tenderness: There is no abdominal tenderness.   Musculoskeletal:      Comments: Trace bilateral lower extremity edema, left slightly greater than right with faint stasis changes and scattered varicosities   Lymphadenopathy:      Head:      Right side of head: No submandibular adenopathy.      Cervical: No cervical adenopathy.      Upper Body:      Right upper body: No supraclavicular adenopathy.      Left upper body: No supraclavicular adenopathy.   Skin:     General: Skin is warm and dry.      Findings: No rash.   Neurological:      Mental Status: She is alert and oriented to person, place, and time.      Cranial Nerves: No cranial nerve deficit.      Motor: No abnormal muscle tone.      Deep Tendon Reflexes: Reflexes normal.   Psychiatric:         Behavior: Behavior normal.     Patient was examined today, unchanged from above      Result Review : Reviewed CBC, CMP from today.       Assessment and Plan    *Right breast DCIS ([id number]):  Menarche at age 15, spontaneous menopause at age 45-50.  Uterus is intact.  A1.  She received progesterone to \"stay pregnant\" in the past, no other hormonal treatment.  Family history is significant for a mother with breast cancer (DCIS) at age 79, father with prostate cancer in his 70s, sister with breast cancer at age 64 (patient in our practice), and a paternal uncle with glioblastoma at age 84, maternal grandfather with colon cancer at age 74, maternal first cousin with an abdominal sarcoma at age 25-30.  The patient has undergone routine previous mammograms, reports that she has had frequent six-month follow-up studies performed due to inability to accurately assess her breasts because of increased density.  She has never had a prior breast biopsy.    Mammogram on 2020 showed new calcifications right breast measuring 6 mm.  By ultrasound there was a hypoechoic area in the " 8 o'clock position.    Biopsy 1/23/2020 of the right breast with finding of DCIS, high-grade with comedonecrosis, associated calcifications, solid, cribriform, and comedo growth patterns, largest area measuring 6 mm, ER negative, [place] negative.  Biopsy performed at [facility] and reviewed at [facility] with agreement.    The patient did undergo genetic testing with INVITAE panel test negative 2/6/2020.    Breast MRI 2/13/2020 with finding of a 4.4 cm abnormality in the 8 o'clock position of the right breast with clip in place from prior biopsy.    Given the size of the lesion, patient was felt not to be a candidate for lumpectomy.    Right mastectomy with sentinel lymph node biopsy and tissue expander placement on 3/19/2020 with Dr. Hodges and Dr Gore.  Pathology showed high-grade DCIS, solid and cribriform with associated comedonecrosis.  Margins were negative.  The area of involvement with DCIS measured at least 4 cm.  [place] lymph nodes negative x3.  Given patient's family history and personal history of DCIS, estimated risk of contralateral DCIS or invasive cancer felt to be in the 6-10% range, recommended chemoprevention for risk reduction.  Patient with underlying osteopenia and intact uterus.  Decision to pursue raloxifene 60 mg daily x5 years, initiated 3/26/2020.  Due to poor visualization of prior DCIS on mammogram as well as issues with breast density and difficulty with mammographic evaluation in the past, plan annual follow-up left mammogram and MRI.  Status post tissue expander removal and implant placement on the right side as well as left breast reduction with plastic surgery on 7/20/2020.  Pathology from left breast reduction negative for malignancy.  Patient returns today in follow-up continuing on chemoprevention with raloxifene 60 mg daily.  The patient is tolerating this reasonably well although has experienced some degree of vaginal dryness which she reports as mild in nature.  She was seen by her  gynecologist and they discussed use of topical vaginal estrogen.  We discussed the issue today.  There is some degree of systemic absorption of estrogen when administered in this manner.  It is uncertain the degree to which this might counteract effects of her chemoprevention treatment with raloxifene.  Nevertheless, she did not have an estrogen positive DCIS.  Therefore this is somewhat of a gray area and I would leave the decision to the patient in regards to the severity of her symptoms and potential risks of minimizing chemopreventive effects of her raloxifene.  The patient does have today some new slight bilateral lower extremity edema with slight asymmetry left greater than right.  She has some faint erythema suggestive of venous stasis with some associated varicosities.  Since she is on raloxifene, it would be more prudent to go ahead and check Dopplers to rule out DVT and the patient agrees.  She will have this performed tomorrow.  I did suggest that she continue wearing compression stockings which she has not been wearing as often in the warm humid weather.  Finally, we discussed her ongoing imaging studies.  We have been performing annual mammogram and MRI studies however have been performing studies around the same time each year.  When she was here last we had discussed trying to stagger her studies 6 months apart and she agrees.  Therefore we will have her left mammogram performed in January and I will see her in 6 months and at that time we will order her MRI for July 2024.  Exam today was negative.    *Family history of colon cancer:  Patient does have a family history of colon cancer in her maternal grandfather at age 74  The patient does continue with routine colonoscopy, last performed in July 2017 anticipating a 5-year interval follow-up.  Patient did undergo follow-up colonoscopy on 7/6/2022 with 1 hyperplastic polyp removed, plan follow-up in 5 years again.    *Epilepsy:  Patient does continue  on Keppra, last seizure in 1990    *Chronic intermittent vertigo  Patient has had intermittent chronic issues with vertigo.     *Bilateral lower extremity edema  Patient with increased lower extremity edema, trace on exam but with some asymmetry left greater than right.  There faint venous stasis changes noted and some varicosities, likely all related to venous insufficiency.  Given her treatment with raloxifene however we will go ahead and check bilateral lower extremity Dopplers tomorrow to rule out DVT.  Patient was encouraged to wear compression stockings on a more regular basis.    *Osteopenia  Patient may experience some beneficial effects from raloxifene during her course of chemoprevention  I will defer any decision for continuation of raloxifene to her gynecologist beyond the 5-year timeframe.  No evidence to suggest that continuation of raloxifene beyond this timeframe would confer any additional benefit from a chemoprevention standpoint and there are better medications for treatment of osteopenia at that point.      Plan:  Continue chemoprevention with raloxifene 60 mg daily.   Bilateral lower extremity Doppler in a.m. to rule out DVT  Patient encouraged to wear bilateral lower extremity compression stockings more routinely  Patient will decide regarding use of topical vaginal estrogen from her gynecologist depending on the severity of her symptoms and potential to counteract chemopreventive effects of raloxifene  Schedule annual left mammogram in January 2023  We will plan to stagger surveillance breast MRI until July 2024  Return in 6 months for follow-up with CBC, CMP

## 2023-07-26 ENCOUNTER — HOSPITAL ENCOUNTER (OUTPATIENT)
Dept: CARDIOLOGY | Facility: HOSPITAL | Age: 68
Discharge: HOME | End: 2023-07-26
Admitting: INTERNAL MEDICINE
Payer: COMMERCIAL

## 2023-07-26 DIAGNOSIS — C50.811 MALIGNANT NEOPLASM OF OVERLAPPING SITES OF RIGHT BREAST IN FEMALE, ESTROGEN RECEPTOR NEGATIVE: ICD-10-CM

## 2023-07-26 DIAGNOSIS — D05.11 DUCTAL CARCINOMA IN SITU (DCIS) OF RIGHT BREAST: ICD-10-CM

## 2023-07-26 DIAGNOSIS — Z17.1 MALIGNANT NEOPLASM OF OVERLAPPING SITES OF RIGHT BREAST IN FEMALE, ESTROGEN RECEPTOR NEGATIVE: ICD-10-CM

## 2023-07-26 DIAGNOSIS — R60.0 LOWER EXTREMITY EDEMA: ICD-10-CM

## 2023-07-26 LAB

## 2023-07-26 PROCEDURE — 93970 EXTREMITY STUDY: CPT

## 2023-10-06 RX ORDER — RALOXIFENE HYDROCHLORIDE 60 MG/1
60 TABLET, FILM COATED ORAL DAILY
Qty: 90 TABLET | Refills: 1 | Status: SHIPPED | OUTPATIENT
Start: 2023-10-06

## 2024-01-22 ENCOUNTER — TELEPHONE (OUTPATIENT)
Dept: ONCOLOGY | Facility: CLINIC | Age: 69
End: 2024-01-22
Payer: MEDICARE

## 2024-01-22 RX ORDER — RALOXIFENE HYDROCHLORIDE 60 MG/1
60 TABLET, FILM COATED ORAL DAILY
Qty: 90 TABLET | Refills: 1 | Status: SHIPPED | OUTPATIENT
Start: 2024-01-22

## 2024-01-22 NOTE — TELEPHONE ENCOUNTER
"    Caller: Honey Avila \"Marissa\"    Relationship: Self    Best call back number: [phone]     Requested Prescriptions:   Requested Prescriptions     Pending Prescriptions Disp Refills    raloxifene (EVISTA) 60 MG tablet 90 tablet 1     Sig: Take 1 tablet by mouth Daily.        Pharmacy where request should be sent: [id number] [place], [place] - [address] [place] [address] AT [facility] - [phone]  - [phone] FX     Last office visit with prescribing clinician: 7/25/2023   Last telemedicine visit with prescribing clinician: Visit date not found   Next office visit with prescribing clinician: 2/2/2024         Does the patient have less than a 3 day supply:  [] Yes  [x] No    Would you like a call back once the refill request has been completed: [] Yes [x] No    If the office needs to give you a call back, can they leave a voicemail: [] Yes [x] No    Melody Meredith Rep   01/22/24 14:09 EST         "

## 2024-01-29 ENCOUNTER — HOSPITAL ENCOUNTER (OUTPATIENT)
Dept: MAMMOGRAPHY | Facility: HOSPITAL | Age: 69
Discharge: HOME | End: 2024-01-29
Admitting: INTERNAL MEDICINE
Payer: MEDICARE

## 2024-01-29 DIAGNOSIS — D05.11 DUCTAL CARCINOMA IN SITU (DCIS) OF RIGHT BREAST: ICD-10-CM

## 2024-01-29 DIAGNOSIS — R60.0 LOWER EXTREMITY EDEMA: ICD-10-CM

## 2024-01-29 DIAGNOSIS — C50.811 MALIGNANT NEOPLASM OF OVERLAPPING SITES OF RIGHT BREAST IN FEMALE, ESTROGEN RECEPTOR NEGATIVE: ICD-10-CM

## 2024-01-29 DIAGNOSIS — Z17.1 MALIGNANT NEOPLASM OF OVERLAPPING SITES OF RIGHT BREAST IN FEMALE, ESTROGEN RECEPTOR NEGATIVE: ICD-10-CM

## 2024-01-29 PROCEDURE — 77063 BREAST TOMOSYNTHESIS BI: CPT

## 2024-01-29 PROCEDURE — 77067 SCR MAMMO BI INCL CAD: CPT

## 2024-02-01 NOTE — PROGRESS NOTES
"Chief Complaint  Right breast DCIS    Subjective        History of Present Illness  Patient returns today in follow-up continuing on chemoprevention with raloxifene 60 mg daily (initiated 3/26/2020) x5 years.  Patient has laboratory studies and mammogram to review today.  She at the last visit was reporting vaginal dryness and there was discussion with her gynecologist about use of topical estrogen.  We discussed potential issues with systemic absorption and she decided not to use that preparation, reports that the symptoms continue to be mild and tolerable.  She has no other specific complaints today.  She is wearing her compression stockings which is controlling her mild lower extremity edema.  We did check a Doppler at her last visit on 7/25/2023 which was negative for DVT.      Objective   Vital Signs:   /81   Pulse 74   Temp 97.8 °F (36.6 °C) (Temporal)   Resp 16   Ht 167.6 cm (65.98\")   Wt 73.8 kg (162 lb 9.6 oz)   SpO2 98%   BMI 26.26 kg/m²     Physical Exam  Constitutional:       Appearance: She is well-developed.   Eyes:      Conjunctiva/sclera: Conjunctivae normal.   Neck:      Thyroid: No thyromegaly.   Cardiovascular:      Rate and Rhythm: Normal rate and regular rhythm.      Heart sounds: No murmur heard.     No friction rub. No gallop.   Pulmonary:      Effort: No respiratory distress.      Breath sounds: Normal breath sounds.      Comments: Status post right mastectomy with implant reconstruction, no masses or abnormalities palpated.  Left breast without masses or abnormalities palpated.  Abdominal:      General: Bowel sounds are normal. There is no distension.      Palpations: Abdomen is soft.      Tenderness: There is no abdominal tenderness.   Musculoskeletal:      Comments: No current edema, bilateral compression stockings in place   Lymphadenopathy:      Head:      Right side of head: No submandibular adenopathy.      Cervical: No cervical adenopathy.      Upper Body:      Right " "upper body: No supraclavicular adenopathy.      Left upper body: No supraclavicular adenopathy.   Skin:     General: Skin is warm and dry.      Findings: No rash.   Neurological:      Mental Status: She is alert and oriented to person, place, and time.      Cranial Nerves: No cranial nerve deficit.      Motor: No abnormal muscle tone.      Deep Tendon Reflexes: Reflexes normal.   Psychiatric:         Behavior: Behavior normal.             Result Review : Reviewed CBC, CMP from today.  Reviewed mammogram 2024.       Assessment and Plan    *Right breast DCIS ([id number]):  Menarche at age 15, spontaneous menopause at age 45-50.  Uterus is intact.  A1.  She received progesterone to \"stay pregnant\" in the past, no other hormonal treatment.  Family history is significant for a mother with breast cancer (DCIS) at age 79, father with prostate cancer in his 70s, sister with breast cancer at age 64 (patient in our practice), and a paternal uncle with glioblastoma at age 84, maternal grandfather with colon cancer at age 74, maternal first cousin with an abdominal sarcoma at age 25-30.  The patient has undergone routine previous mammograms, reports that she has had frequent six-month follow-up studies performed due to inability to accurately assess her breasts because of increased density.  She has never had a prior breast biopsy.    Mammogram on 2020 showed new calcifications right breast measuring 6 mm.  By ultrasound there was a hypoechoic area in the 8 o'clock position.    Biopsy 2020 of the right breast with finding of DCIS, high-grade with comedonecrosis, associated calcifications, solid, cribriform, and comedo growth patterns, largest area measuring 6 mm, ER negative, [place] negative.  Biopsy performed at [facility] and reviewed at [facility], [place] with agreement.    The patient did undergo genetic testing with INVITAE panel test negative 2020.    Breast MRI 2020 with finding of a 4.4 cm abnormality in the 8 " o'clock position of the right breast with clip in place from prior biopsy.    Given the size of the lesion, patient was felt not to be a candidate for lumpectomy.    Right mastectomy with sentinel lymph node biopsy and tissue expander placement on 3/19/2020 with Dr. Hodges and Dr Gore.  Pathology showed high-grade DCIS, solid and cribriform with associated comedonecrosis.  Margins were negative.  The area of involvement with DCIS measured at least 4 cm.  [place] lymph nodes negative x3.  Given patient's family history and personal history of DCIS, estimated risk of contralateral DCIS or invasive cancer felt to be in the 6-10% range, recommended chemoprevention for risk reduction.  Patient with underlying osteopenia and intact uterus.  Decision to pursue raloxifene 60 mg daily x5 years, initiated 3/26/2020.  Due to poor visualization of prior DCIS on mammogram as well as issues with breast density and difficulty with mammographic evaluation in the past, plan annual follow-up left mammogram and MRI.  Status post tissue expander removal and implant placement on the right side as well as left breast reduction with plastic surgery on 7/20/2020.  Pathology from left breast reduction negative for malignancy.  Patient returns today in follow-up continuing on raloxifene 60 mg daily as chemoprevention x 5 years that was initiated on 3/26/2020.  Patient is tolerating raloxifene relatively well.  She does have some mild vaginal dryness, elected not to use topical estrogen due to concerns regarding systemic absorption.  She reports that symptoms are stable and tolerable.  We did review her mammogram from 1/29/2024 which was negative, BI-RADS 1.  Her exam today is negative.  We are staggering her in mammogram and breast MRI by 6 months.  We will order her MRI prior to her return visit here in 6 months.  She has a little over a year left on raloxifene.    *Family history of colon cancer:  Patient does have a family history of  colon cancer in her maternal grandfather at age 74  The patient does continue with routine colonoscopy, last performed in July 2017 anticipating a 5-year interval follow-up.  Patient did undergo follow-up colonoscopy on 7/6/2022 with 1 hyperplastic polyp removed, plan follow-up in 5 years again.    *Epilepsy:  Patient does continue on Keppra, last seizure in 1990    *Chronic intermittent vertigo  Patient has had intermittent chronic issues with vertigo.     *Bilateral lower extremity edema  Patient with chronic bilateral lower extremity edema with some slight asymmetry  Edema and increased and patient underwent Doppler on 7/25/2023 which was negative for DVT  Edema currently improved with compression stockings in place.    *Osteopenia  Patient may experience some beneficial effects from raloxifene during her course of chemoprevention  I will defer any decision for continuation of raloxifene to her gynecologist beyond the 5-year timeframe.  No evidence to suggest that continuation of raloxifene beyond this timeframe would confer any additional benefit from a chemoprevention standpoint and there are better medications for treatment of osteopenia at that point.      Plan:  Continue chemoprevention with raloxifene 60 mg daily.   Schedule annual screening left breast MRI in 6 months prior to return visit  Return in 6 months for follow-up with CBC, CMP

## 2024-02-02 ENCOUNTER — OFFICE VISIT (OUTPATIENT)
Dept: ONCOLOGY | Facility: CLINIC | Age: 69
End: 2024-02-02
Payer: MEDICARE

## 2024-02-02 ENCOUNTER — LAB (OUTPATIENT)
Dept: LAB | Facility: HOSPITAL | Age: 69
End: 2024-02-02
Payer: MEDICARE

## 2024-02-02 VITALS
RESPIRATION RATE: 16 BRPM | BODY MASS INDEX: 26.13 KG/M2 | TEMPERATURE: 97.8 F | SYSTOLIC BLOOD PRESSURE: 135 MMHG | HEIGHT: 66 IN | WEIGHT: 162.6 LBS | OXYGEN SATURATION: 98 % | HEART RATE: 74 BPM | DIASTOLIC BLOOD PRESSURE: 81 MMHG

## 2024-02-02 DIAGNOSIS — C50.811 MALIGNANT NEOPLASM OF OVERLAPPING SITES OF RIGHT BREAST IN FEMALE, ESTROGEN RECEPTOR NEGATIVE: ICD-10-CM

## 2024-02-02 DIAGNOSIS — D05.11 DUCTAL CARCINOMA IN SITU (DCIS) OF RIGHT BREAST: ICD-10-CM

## 2024-02-02 DIAGNOSIS — R60.0 LOWER EXTREMITY EDEMA: ICD-10-CM

## 2024-02-02 DIAGNOSIS — Z17.1 MALIGNANT NEOPLASM OF OVERLAPPING SITES OF RIGHT BREAST IN FEMALE, ESTROGEN RECEPTOR NEGATIVE: ICD-10-CM

## 2024-02-02 DIAGNOSIS — D05.11 DUCTAL CARCINOMA IN SITU (DCIS) OF RIGHT BREAST: Primary | ICD-10-CM

## 2024-02-02 LAB
ALBUMIN SERPL-MCNC: 4 G/DL (ref 3.5–5.2)
ALBUMIN/GLOB SERPL: 1.5 G/DL
ALP SERPL-CCNC: 61 U/L (ref 39–117)
ALT SERPL W P-5'-P-CCNC: 13 U/L (ref 1–33)
ANION GAP SERPL CALCULATED.3IONS-SCNC: 9.6 MMOL/L (ref 5–15)
AST SERPL-CCNC: 20 U/L (ref 1–32)
BASOPHILS # BLD AUTO: 0.04 10*3/MM3 (ref 0–0.2)
BASOPHILS NFR BLD AUTO: 0.6 % (ref 0–1.5)
BILIRUB SERPL-MCNC: 0.4 MG/DL (ref 0–1.2)
BUN SERPL-MCNC: 22 MG/DL (ref 8–23)
BUN/CREAT SERPL: 29.7 (ref 7–25)
CALCIUM SPEC-SCNC: 9.2 MG/DL (ref 8.6–10.5)
CHLORIDE SERPL-SCNC: 105 MMOL/L (ref 98–107)
CO2 SERPL-SCNC: 26.4 MMOL/L (ref 22–29)
CREAT SERPL-MCNC: 0.74 MG/DL (ref 0.57–1)
DEPRECATED RDW RBC AUTO: 47.9 FL (ref 37–54)
EGFRCR SERPLBLD CKD-EPI 2021: 88.8 ML/MIN/1.73
EOSINOPHIL # BLD AUTO: 0.14 10*3/MM3 (ref 0–0.4)
EOSINOPHIL NFR BLD AUTO: 2 % (ref 0.3–6.2)
ERYTHROCYTE [DISTWIDTH] IN BLOOD BY AUTOMATED COUNT: 14.1 % (ref 12.3–15.4)
GLOBULIN UR ELPH-MCNC: 2.6 GM/DL
GLUCOSE SERPL-MCNC: 113 MG/DL (ref 65–99)
HCT VFR BLD AUTO: 38.7 % (ref 34–46.6)
HGB BLD-MCNC: 13 G/DL (ref 12–15.9)
IMM GRANULOCYTES # BLD AUTO: 0.02 10*3/MM3 (ref 0–0.05)
IMM GRANULOCYTES NFR BLD AUTO: 0.3 % (ref 0–0.5)
LYMPHOCYTES # BLD AUTO: 2.49 10*3/MM3 (ref 0.7–3.1)
LYMPHOCYTES NFR BLD AUTO: 35 % (ref 19.6–45.3)
MCH RBC QN AUTO: 31.3 PG (ref 26.6–33)
MCHC RBC AUTO-ENTMCNC: 33.6 G/DL (ref 31.5–35.7)
MCV RBC AUTO: 93 FL (ref 79–97)
MONOCYTES # BLD AUTO: 0.7 10*3/MM3 (ref 0.1–0.9)
MONOCYTES NFR BLD AUTO: 9.8 % (ref 5–12)
NEUTROPHILS NFR BLD AUTO: 3.73 10*3/MM3 (ref 1.7–7)
NEUTROPHILS NFR BLD AUTO: 52.3 % (ref 42.7–76)
NRBC BLD AUTO-RTO: 0 /100 WBC (ref 0–0.2)
PLATELET # BLD AUTO: 246 10*3/MM3 (ref 140–450)
PMV BLD AUTO: 9.4 FL (ref 6–12)
POTASSIUM SERPL-SCNC: 4.2 MMOL/L (ref 3.5–5.2)
PROT SERPL-MCNC: 6.6 G/DL (ref 6–8.5)
RBC # BLD AUTO: 4.16 10*6/MM3 (ref 3.77–5.28)
SODIUM SERPL-SCNC: 141 MMOL/L (ref 136–145)
WBC NRBC COR # BLD AUTO: 7.12 10*3/MM3 (ref 3.4–10.8)

## 2024-02-02 PROCEDURE — 1160F RVW MEDS BY RX/DR IN RCRD: CPT | Performed by: INTERNAL MEDICINE

## 2024-02-02 PROCEDURE — 36415 COLL VENOUS BLD VENIPUNCTURE: CPT

## 2024-02-02 PROCEDURE — 85025 COMPLETE CBC W/AUTO DIFF WBC: CPT

## 2024-02-02 PROCEDURE — 80053 COMPREHEN METABOLIC PANEL: CPT

## 2024-02-02 PROCEDURE — 1126F AMNT PAIN NOTED NONE PRSNT: CPT | Performed by: INTERNAL MEDICINE

## 2024-02-02 PROCEDURE — 1159F MED LIST DOCD IN RCRD: CPT | Performed by: INTERNAL MEDICINE

## 2024-02-02 PROCEDURE — 99214 OFFICE O/P EST MOD 30 MIN: CPT | Performed by: INTERNAL MEDICINE

## 2024-04-19 RX ORDER — RALOXIFENE HYDROCHLORIDE 60 MG/1
60 TABLET, FILM COATED ORAL DAILY
Qty: 90 TABLET | Refills: 1 | Status: SHIPPED | OUTPATIENT
Start: 2024-04-19

## 2024-04-19 NOTE — TELEPHONE ENCOUNTER
"    Caller: Honey Avila \"Marissa\"    Relationship: Self    Best call back number: [phone]    Requested Prescriptions:   Requested Prescriptions     Pending Prescriptions Disp Refills    raloxifene (EVISTA) 60 MG tablet 90 tablet 1     Sig: Take 1 tablet by mouth Daily.        Pharmacy where request should be sent: [facility]/PHARMACY #[id number] [place], [place] - [address] [address]. AT [facility] [phone] [facility] [phone]      Last office visit with prescribing clinician: 2/2/2024   Last telemedicine visit with prescribing clinician: Visit date not found   Next office visit with prescribing clinician: 7/26/2024     Additional details provided by patient: PLEASE CANCEL THE SCRIPT AT [facility] AND RESEND TO [facility] ITS CHEAPER     Does the patient have less than a 3 day supply:  [] Yes  [x] No    If the office needs to give you a call back, can they leave a voicemail: [x] Yes [] No  "

## 2024-04-25 ENCOUNTER — TELEPHONE (OUTPATIENT)
Dept: ONCOLOGY | Facility: CLINIC | Age: 69
End: 2024-04-25

## 2024-04-25 NOTE — TELEPHONE ENCOUNTER
"    Caller: Honey Avila \"Marissa\"    Relationship to patient: Self    Best call back number: [phone]    Type of visit: LAB AND F/U APPT    Requested date: NOT TUESDAYS, AFTER 3 PM ANY OTHER DAY    If rescheduling, when is the original appointment: 8/30    "

## 2024-04-26 NOTE — TELEPHONE ENCOUNTER
"  Caller: Honey Avila \"Marissa\"    Relationship to patient: Self    Best call back number: [phone]     Chief complaint: PT CAN NOT DO THE APPT FOR 08/21/24 FOR THE MRI AND NEEDS A DIFFERENT DATE FOR THAT AND HAS HAD PROBLEMS GETTING THAT CHANGED.    ALSO THE PT WAS R/S TO 08/27/24, BUT CAN NOT DUE TUESDAYS, THIS APPT NEEDS TO BE CHANGED AS WELL     PT CAN DO ANY OTHER DA OTHER THAN TUESDAYS, BUT IT MUST BE AFTER 3 PM.     Type of visit: MRI, LAB, FOLLOW UP    Requested date: PLEASE CALL THE PT BACK TO GET THIS ALL R/S      If rescheduling, when is the original appointment: 08/21/24 AND 08/27/24          "

## 2024-07-17 RX ORDER — RALOXIFENE HYDROCHLORIDE 60 MG/1
60 TABLET, FILM COATED ORAL DAILY
Qty: 90 TABLET | Refills: 1 | Status: SHIPPED | OUTPATIENT
Start: 2024-07-17

## 2024-07-22 ENCOUNTER — TELEPHONE (OUTPATIENT)
Dept: ONCOLOGY | Facility: CLINIC | Age: 69
End: 2024-07-22
Payer: MEDICARE

## 2024-07-22 NOTE — TELEPHONE ENCOUNTER
Left message for patient explaining that Dr. Bella is out of the office this week and we will follow up with her next week to see if Dr. Bella wants to repeat her labs at her upcoming appointment.

## 2024-07-22 NOTE — TELEPHONE ENCOUNTER
"  Caller: Honey Avila \"Marissa\"    Relationship: Self    Best call back number: [phone]     What is the best time to reach you: ASAP    Who are you requesting to speak with (clinical staff, provider,  specific staff member): CLINICAL      What was the call regarding: PATIENT SAYS HER PCP JUST HAD LABS DONE TODAY , AND ASKING IF THE 8/30 LAB/APPT WITH DR WEISS IS STILL NEEDED BASED ON THIS, OR IF ANYTHING NEEDS TO CHANGE.  SAYS DR WEISS SHOULD HAVE ALL THOSE LAB RESULTS AVAILABLE, PLEASE CALL PATIENT TO ADVISE ON THIS.      "

## 2024-08-21 ENCOUNTER — HOSPITAL ENCOUNTER (OUTPATIENT)
Dept: MRI IMAGING | Facility: HOSPITAL | Age: 69
Discharge: HOME | End: 2024-08-21
Admitting: INTERNAL MEDICINE
Payer: MEDICARE

## 2024-08-21 DIAGNOSIS — D05.11 DUCTAL CARCINOMA IN SITU (DCIS) OF RIGHT BREAST: ICD-10-CM

## 2024-08-21 PROCEDURE — 0 GADOBENATE DIMEGLUMINE 529 MG/ML SOLUTION: Performed by: INTERNAL MEDICINE

## 2024-08-21 PROCEDURE — C8908 MRI W/O FOL W/CONT, BREAST,: HCPCS

## 2024-08-21 PROCEDURE — A9577 INJ MULTIHANCE: HCPCS | Performed by: INTERNAL MEDICINE

## 2024-08-21 PROCEDURE — C8937 CAD BREAST MRI: HCPCS

## 2024-08-21 RX ADMIN — GADOBENATE DIMEGLUMINE 15 ML: 529 INJECTION, SOLUTION INTRAVENOUS at 16:28

## 2024-08-29 NOTE — PROGRESS NOTES
"Chief Complaint  Right breast DCIS    Subjective        History of Present Illness  Patient returns today in follow-up continuing on chemoprevention with raloxifene 60 mg daily (initiated 3/26/2020) x5 years.  She also continues on enhanced surveillance with alternating annual mammogram and breast MRI since her DCIS was poorly visualized on mammogram and patient with increased breast density.  Patient continues to tolerate raloxifene well.  She has some mild vaginal dryness which is tolerable.  Her main complaint is that of ongoing osteoarthritic pain on the right side for which she is due fairly soon for a repeat injection.  She has no other complaints today.      Objective   Vital Signs:   /84   Pulse 75   Temp 98 °F (36.7 °C) (Oral)   Ht 167.6 cm (65.98\")   Wt 73.2 kg (161 lb 6.4 oz)   SpO2 99%   BMI 26.06 kg/m²     Physical Exam  Constitutional:       Appearance: She is well-developed.   Eyes:      Conjunctiva/sclera: Conjunctivae normal.   Neck:      Thyroid: No thyromegaly.   Cardiovascular:      Rate and Rhythm: Normal rate and regular rhythm.      Heart sounds: No murmur heard.     No friction rub. No gallop.   Pulmonary:      Effort: No respiratory distress.      Breath sounds: Normal breath sounds.      Comments: Status post right mastectomy with implant reconstruction, no masses or abnormalities palpated.  Left breast without masses or abnormalities palpated.  Abdominal:      General: Bowel sounds are normal. There is no distension.      Palpations: Abdomen is soft.      Tenderness: There is no abdominal tenderness.   Musculoskeletal:      Comments: No current edema   Lymphadenopathy:      Head:      Right side of head: No submandibular adenopathy.      Cervical: No cervical adenopathy.      Upper Body:      Right upper body: No supraclavicular adenopathy.      Left upper body: No supraclavicular adenopathy.   Skin:     General: Skin is warm and dry.      Findings: No rash.   Neurological:     " " Mental Status: She is alert and oriented to person, place, and time.      Cranial Nerves: No cranial nerve deficit.      Motor: No abnormal muscle tone.      Deep Tendon Reflexes: Reflexes normal.   Psychiatric:         Behavior: Behavior normal.       Patient was examined today, unchanged from above      Result Review : Reviewed CBC, CMP from today.  Reviewed breast MRI from 2024       Assessment and Plan    *Right breast DCIS ([id number]):  Menarche at age 15, spontaneous menopause at age 45-50.  Uterus is intact.  A1.  She received progesterone to \"stay pregnant\" in the past, no other hormonal treatment.  Family history is significant for a mother with breast cancer (DCIS) at age 79, father with prostate cancer in his 70s, sister with breast cancer at age 64 (patient in our practice), and a paternal uncle with glioblastoma at age 84, maternal grandfather with colon cancer at age 74, maternal first cousin with an abdominal sarcoma at age 25-30.  The patient has undergone routine previous mammograms, reports that she has had frequent six-month follow-up studies performed due to inability to accurately assess her breasts because of increased density.  She has never had a prior breast biopsy.    Mammogram on 2020 showed new calcifications right breast measuring 6 mm.  By ultrasound there was a hypoechoic area in the 8 o'clock position.    Biopsy 2020 of the right breast with finding of DCIS, high-grade with comedonecrosis, associated calcifications, solid, cribriform, and comedo growth patterns, largest area measuring 6 mm, ER negative, [place] negative.  Biopsy performed at [facility] and reviewed at [facility] with agreement.    The patient did undergo genetic testing with INVITAE panel test negative 2020.    Breast MRI 2020 with finding of a 4.4 cm abnormality in the 8 o'clock position of the right breast with clip in place from prior biopsy.    Given the size of the lesion, patient was felt not " to be a candidate for lumpectomy.    Right mastectomy with sentinel lymph node biopsy and tissue expander placement on 3/19/2020 with Dr. Hodges and Dr Gore.  Pathology showed high-grade DCIS, solid and cribriform with associated comedonecrosis.  Margins were negative.  The area of involvement with DCIS measured at least 4 cm.  [place] lymph nodes negative x3.  Given patient's family history and personal history of DCIS, estimated risk of contralateral DCIS or invasive cancer felt to be in the 6-10% range, recommended chemoprevention for risk reduction.  Patient with underlying osteopenia and intact uterus.  Decision to pursue raloxifene 60 mg daily x5 years, initiated 3/26/2020.  Due to poor visualization of prior DCIS on mammogram as well as issues with breast density and difficulty with mammographic evaluation in the past, plan annual follow-up left mammogram and MRI.  Status post tissue expander removal and implant placement on the right side as well as left breast reduction with plastic surgery on 7/20/2020.  Pathology from left breast reduction negative for malignancy.  Patient returns today in follow-up continuing on raloxifene 60 mg daily as chemoprevention x 5 years that was initiated on 3/26/2020.  Patient is tolerating raloxifene relatively well.  She does have some mild vaginal dryness which is tolerable.  She has no other side effects.  Her exam today is negative.  We reviewed her breast MRI from 8/21/2024 which was negative, BI-RADS 2.  We will schedule her annual left mammogram in January.  I will see her in 6 months for follow-up.  Shortly thereafter she will be completing 5 years of raloxifene in March 2025.    *Family history of colon cancer:  Patient does have a family history of colon cancer in her maternal grandfather at age 74  The patient does continue with routine colonoscopy, last performed in July 2017 anticipating a 5-year interval follow-up.  Patient did undergo follow-up colonoscopy  on 7/6/2022 with 1 hyperplastic polyp removed, plan follow-up in 5 years again.    *Epilepsy:  Patient does continue on Keppra, last seizure in 1990    *Chronic intermittent vertigo  Patient has had intermittent chronic issues with vertigo.     *Bilateral lower extremity edema  Patient with chronic bilateral lower extremity edema with some slight asymmetry  Edema and increased and patient underwent Doppler on 7/25/2023 which was negative for DVT  Edema improved, patient wears compression stockings routinely    *Osteopenia  Patient may experience some beneficial effects from raloxifene during her course of chemoprevention  I will defer any decision for continuation of raloxifene to her gynecologist beyond the 5-year timeframe.  No evidence to suggest that continuation of raloxifene beyond this timeframe would confer any additional benefit from a chemoprevention standpoint and there are better medications for treatment of osteopenia at that point.      Plan:  Continue chemoprevention with raloxifene 60 mg daily.   Schedule annual screening left mammogram in January 2025.  Return in 6 months for follow-up with CBC, CMP.  Patient will be completing 5 years chemoprevention with raloxifene shortly thereafter in March 2025.

## 2024-08-30 ENCOUNTER — OFFICE VISIT (OUTPATIENT)
Dept: ONCOLOGY | Facility: CLINIC | Age: 69
End: 2024-08-30
Payer: MEDICARE

## 2024-08-30 ENCOUNTER — LAB (OUTPATIENT)
Dept: LAB | Facility: HOSPITAL | Age: 69
End: 2024-08-30
Payer: MEDICARE

## 2024-08-30 VITALS
SYSTOLIC BLOOD PRESSURE: 144 MMHG | WEIGHT: 161.4 LBS | OXYGEN SATURATION: 99 % | TEMPERATURE: 98 F | BODY MASS INDEX: 25.94 KG/M2 | HEIGHT: 66 IN | DIASTOLIC BLOOD PRESSURE: 84 MMHG | HEART RATE: 75 BPM

## 2024-08-30 DIAGNOSIS — Z12.31 ENCOUNTER FOR SCREENING MAMMOGRAM FOR MALIGNANT NEOPLASM OF BREAST: ICD-10-CM

## 2024-08-30 DIAGNOSIS — D05.11 DUCTAL CARCINOMA IN SITU (DCIS) OF RIGHT BREAST: ICD-10-CM

## 2024-08-30 DIAGNOSIS — D05.11 DUCTAL CARCINOMA IN SITU (DCIS) OF RIGHT BREAST: Primary | ICD-10-CM

## 2024-08-30 PROCEDURE — 36415 COLL VENOUS BLD VENIPUNCTURE: CPT

## 2024-10-23 RX ORDER — RALOXIFENE HYDROCHLORIDE 60 MG/1
60 TABLET, FILM COATED ORAL DAILY
Qty: 90 TABLET | Refills: 3 | Status: SHIPPED | OUTPATIENT
Start: 2024-10-23

## 2024-10-23 NOTE — TELEPHONE ENCOUNTER
"    Caller: Honey Avila \"Marissa\"    Relationship: Self    Best call back number: [phone]    Requested Prescriptions:   Requested Prescriptions     Pending Prescriptions Disp Refills    raloxifene (EVISTA) 60 MG tablet 90 tablet 1     Sig: Take 1 tablet by mouth Daily.        Pharmacy where request should be sent: [facility]/PHARMACY #[id number] [facility] [address] [address]. AT [facility] [phone] [facility] [phone]      Last office visit with prescribing clinician: 8/30/2024   Last telemedicine visit with prescribing clinician: Visit date not found   Next office visit with prescribing clinician: 3/10/2025     Additional details provided by patient:     Does the patient have less than a 3 day supply:  [] Yes  [x] No    Would you like a call back once the refill request has been completed: [] Yes [x] No    If the office needs to give you a call back, can they leave a voicemail: [x] Yes [] No    Melody Mohan Rep   10/23/24 15:42 EDT   "

## 2025-01-30 ENCOUNTER — HOSPITAL ENCOUNTER (OUTPATIENT)
Dept: MAMMOGRAPHY | Facility: HOSPITAL | Age: 70
Discharge: HOME | End: 2025-01-30
Admitting: INTERNAL MEDICINE
Payer: MEDICARE

## 2025-01-30 DIAGNOSIS — D05.11 DUCTAL CARCINOMA IN SITU (DCIS) OF RIGHT BREAST: ICD-10-CM

## 2025-01-30 DIAGNOSIS — Z12.31 ENCOUNTER FOR SCREENING MAMMOGRAM FOR MALIGNANT NEOPLASM OF BREAST: ICD-10-CM

## 2025-01-30 PROCEDURE — 77063 BREAST TOMOSYNTHESIS BI: CPT

## 2025-01-30 PROCEDURE — 77067 SCR MAMMO BI INCL CAD: CPT

## 2025-03-07 NOTE — PROGRESS NOTES
"Chief Complaint  Right breast DCIS    Subjective        History of Present Illness  Patient returns today in follow-up continuing on chemoprevention with raloxifene 60 mg daily (initiated 3/26/2020) x5 years.  She also continues on enhanced surveillance with alternating annual mammogram and breast MRI since her DCIS was poorly visualized on mammogram and patient with increased breast density.  Patient is now completing 5 years of raloxifene and will discontinue the medication at this time.  She had been tolerating treatment well.  She has no complaints today. She did undergo annual mammogram on the left side 1/30/2025 which was negative, BI-RADS 1      Objective   Vital Signs:   /77   Pulse 85   Temp 97.6 °F (36.4 °C) (Oral)   Ht 168 cm (66.14\")   Wt 72.7 kg (160 lb 4.8 oz)   SpO2 95%   BMI 25.76 kg/m²     Physical Exam  Constitutional:       Appearance: She is well-developed.   Eyes:      Conjunctiva/sclera: Conjunctivae normal.   Neck:      Thyroid: No thyromegaly.   Cardiovascular:      Rate and Rhythm: Normal rate and regular rhythm.      Heart sounds: No murmur heard.     No friction rub. No gallop.   Pulmonary:      Effort: No respiratory distress.      Breath sounds: Normal breath sounds.      Comments: Status post right mastectomy with implant reconstruction, no masses or abnormalities palpated.  Left breast without masses or abnormalities palpated.  Abdominal:      General: Bowel sounds are normal. There is no distension.      Palpations: Abdomen is soft.      Tenderness: There is no abdominal tenderness.   Musculoskeletal:      Comments: No current edema   Lymphadenopathy:      Head:      Right side of head: No submandibular adenopathy.      Cervical: No cervical adenopathy.      Upper Body:      Right upper body: No supraclavicular adenopathy.      Left upper body: No supraclavicular adenopathy.   Skin:     General: Skin is warm and dry.      Findings: No rash.   Neurological:      Mental " "Status: She is alert and oriented to person, place, and time.      Cranial Nerves: No cranial nerve deficit.      Motor: No abnormal muscle tone.      Deep Tendon Reflexes: Reflexes normal.   Psychiatric:         Behavior: Behavior normal.       Patient was examined today, unchanged from above      Result Review : Reviewed CBC, CMP from today.  Reviewed mammogram 2025.         Assessment and Plan    *Right breast DCIS ([id number]):  Menarche at age 15, spontaneous menopause at age 45-50.  Uterus is intact.  A1.  She received progesterone to \"stay pregnant\" in the past, no other hormonal treatment.  Family history is significant for a mother with breast cancer (DCIS) at age 79, father with prostate cancer in his 70s, sister with breast cancer at age 64 (patient in our practice), and a paternal uncle with glioblastoma at age 84, maternal grandfather with colon cancer at age 74, maternal first cousin with an abdominal sarcoma at age 25-30.  The patient has undergone routine previous mammograms, reports that she has had frequent six-month follow-up studies performed due to inability to accurately assess her breasts because of increased density.  She has never had a prior breast biopsy.    Mammogram on 2020 showed new calcifications right breast measuring 6 mm.  By ultrasound there was a hypoechoic area in the 8 o'clock position.    Biopsy 2020 of the right breast with finding of DCIS, high-grade with comedonecrosis, associated calcifications, solid, cribriform, and comedo growth patterns, largest area measuring 6 mm, ER negative, UT negative.  Biopsy performed at [facility] and reviewed at [facility] with agreement.    The patient did undergo genetic testing with INVITAE panel test negative 2020.    Breast MRI 2020 with finding of a 4.4 cm abnormality in the 8 o'clock position of the right breast with clip in place from prior biopsy.    Given the size of the lesion, patient was felt not to be a " candidate for lumpectomy.    Right mastectomy with sentinel lymph node biopsy and tissue expander placement on 3/19/2020 with Dr. Hodges and Dr Gore.  Pathology showed high-grade DCIS, solid and cribriform with associated comedonecrosis.  Margins were negative.  The area of involvement with DCIS measured at least 4 cm.  [place] lymph nodes negative x3.  Given patient's family history and personal history of DCIS, estimated risk of contralateral DCIS or invasive cancer felt to be in the 6-10% range, recommended chemoprevention for risk reduction.  Patient with underlying osteopenia and intact uterus.  Decision to pursue raloxifene 60 mg daily x5 years, initiated 3/26/2020.  Due to poor visualization of prior DCIS on mammogram as well as issues with breast density and difficulty with mammographic evaluation in the past, plan annual follow-up left mammogram and MRI.  Status post tissue expander removal and implant placement on the right side as well as left breast reduction with plastic surgery on 7/20/2020.  Pathology from left breast reduction negative for malignancy.  Chemoprevention with raloxifene discontinued after 5 years on 3/10/2025.  Patient returns today in follow-up continuing on raloxifene 60 mg daily as chemoprevention x 5 years that was initiated on 3/26/2020.  She has now completed 5 years of chemoprevention and we will discontinue raloxifene at this time.  Patient does continue with enhanced surveillance with MRI and mammogram due to poor visualization of prior DCIS on mammogram and increased breast density.  She would like to continue with enhanced surveillance and I agree.  We will go ahead and schedule her MRI in August 2025 and mammogram in January 2026.  Most recent mammogram on 1/30/2025 was negative, BI-RADS 1.  Exam today was negative.  I will see her back in 1 year for follow-up.    *Family history of colon cancer:  Patient does have a family history of colon cancer in her maternal  grandfather at age 74  The patient does continue with routine colonoscopy, last performed in July 2017 anticipating a 5-year interval follow-up.  Patient did undergo follow-up colonoscopy on 7/6/2022 with 1 hyperplastic polyp removed, plan follow-up in 5 years again.    *Epilepsy:  Patient does continue on Keppra, last seizure in 1990    *Chronic intermittent vertigo  Patient has had intermittent chronic issues with vertigo.     *Bilateral lower extremity edema  Patient with chronic bilateral lower extremity edema with some slight asymmetry  Edema and increased and patient underwent Doppler on 7/25/2023 which was negative for DVT  Edema improved, patient wears compression stockings routinely    *Osteopenia  Patient may experience some beneficial effects from raloxifene during her course of chemoprevention  Defer any decision for continuation of raloxifene to her gynecologist beyond the 5-year timeframe.  No evidence to suggest that continuation of raloxifene beyond this timeframe would confer any additional benefit from a chemoprevention standpoint and there are better medications for treatment of osteopenia at that point.      Plan:  Discontinue raloxifene after completion of 5 years treatment (initiated 3/19/2020, discontinued 3/10/2025)  Continue with enhanced surveillance.  Schedule annual mammogram in August 2025 and left screening mammogram in January 2026   MD visit in 1 year with CBC, CMP

## 2025-03-10 ENCOUNTER — OFFICE VISIT (OUTPATIENT)
Dept: ONCOLOGY | Facility: CLINIC | Age: 70
End: 2025-03-10
Payer: MEDICARE

## 2025-03-10 ENCOUNTER — LAB (OUTPATIENT)
Dept: OTHER | Facility: HOSPITAL | Age: 70
End: 2025-03-10
Payer: MEDICARE

## 2025-03-10 VITALS
SYSTOLIC BLOOD PRESSURE: 127 MMHG | HEIGHT: 66 IN | OXYGEN SATURATION: 95 % | BODY MASS INDEX: 25.76 KG/M2 | DIASTOLIC BLOOD PRESSURE: 77 MMHG | WEIGHT: 160.3 LBS | TEMPERATURE: 97.6 F | HEART RATE: 85 BPM

## 2025-03-10 DIAGNOSIS — R92.342 MAMMOGRAPHIC EXTREME DENSITY, LEFT BREAST: ICD-10-CM

## 2025-03-10 DIAGNOSIS — Z12.31 ENCOUNTER FOR SCREENING MAMMOGRAM FOR MALIGNANT NEOPLASM OF BREAST: ICD-10-CM

## 2025-03-10 DIAGNOSIS — D05.11 DUCTAL CARCINOMA IN SITU (DCIS) OF RIGHT BREAST: ICD-10-CM

## 2025-03-10 DIAGNOSIS — D05.11 DUCTAL CARCINOMA IN SITU (DCIS) OF RIGHT BREAST: Primary | ICD-10-CM

## 2025-03-10 LAB
ALBUMIN SERPL-MCNC: 4.2 G/DL (ref 3.5–5.2)
ALBUMIN/GLOB SERPL: 1.6 G/DL
ALP SERPL-CCNC: 78 U/L (ref 39–117)
ALT SERPL W P-5'-P-CCNC: 17 U/L (ref 1–33)
ANION GAP SERPL CALCULATED.3IONS-SCNC: 10.5 MMOL/L (ref 5–15)
AST SERPL-CCNC: 24 U/L (ref 1–32)
BASOPHILS # BLD AUTO: 0.04 10*3/MM3 (ref 0–0.2)
BASOPHILS NFR BLD AUTO: 0.5 % (ref 0–1.5)
BILIRUB SERPL-MCNC: 0.5 MG/DL (ref 0–1.2)
BUN SERPL-MCNC: 20 MG/DL (ref 8–23)
BUN/CREAT SERPL: 25.6 (ref 7–25)
CALCIUM SPEC-SCNC: 9.5 MG/DL (ref 8.6–10.5)
CHLORIDE SERPL-SCNC: 105 MMOL/L (ref 98–107)
CO2 SERPL-SCNC: 24.5 MMOL/L (ref 22–29)
CREAT SERPL-MCNC: 0.78 MG/DL (ref 0.57–1)
DEPRECATED RDW RBC AUTO: 46.4 FL (ref 37–54)
EGFRCR SERPLBLD CKD-EPI 2021: 82.9 ML/MIN/1.73
EOSINOPHIL # BLD AUTO: 0.11 10*3/MM3 (ref 0–0.4)
EOSINOPHIL NFR BLD AUTO: 1.3 % (ref 0.3–6.2)
ERYTHROCYTE [DISTWIDTH] IN BLOOD BY AUTOMATED COUNT: 14.2 % (ref 12.3–15.4)
GLOBULIN UR ELPH-MCNC: 2.7 GM/DL
GLUCOSE SERPL-MCNC: 84 MG/DL (ref 65–99)
HCT VFR BLD AUTO: 39.4 % (ref 34–46.6)
HGB BLD-MCNC: 13.3 G/DL (ref 12–15.9)
IMM GRANULOCYTES # BLD AUTO: 0.07 10*3/MM3 (ref 0–0.05)
IMM GRANULOCYTES NFR BLD AUTO: 0.9 % (ref 0–0.5)
LYMPHOCYTES # BLD AUTO: 3.14 10*3/MM3 (ref 0.7–3.1)
LYMPHOCYTES NFR BLD AUTO: 38.5 % (ref 19.6–45.3)
MCH RBC QN AUTO: 30 PG (ref 26.6–33)
MCHC RBC AUTO-ENTMCNC: 33.8 G/DL (ref 31.5–35.7)
MCV RBC AUTO: 88.9 FL (ref 79–97)
MONOCYTES # BLD AUTO: 0.76 10*3/MM3 (ref 0.1–0.9)
MONOCYTES NFR BLD AUTO: 9.3 % (ref 5–12)
NEUTROPHILS NFR BLD AUTO: 4.04 10*3/MM3 (ref 1.7–7)
NEUTROPHILS NFR BLD AUTO: 49.5 % (ref 42.7–76)
NRBC BLD AUTO-RTO: 0 /100 WBC (ref 0–0.2)
PLATELET # BLD AUTO: 256 10*3/MM3 (ref 140–450)
PMV BLD AUTO: 9.8 FL (ref 6–12)
POTASSIUM SERPL-SCNC: 4.3 MMOL/L (ref 3.5–5.2)
PROT SERPL-MCNC: 6.9 G/DL (ref 6–8.5)
RBC # BLD AUTO: 4.43 10*6/MM3 (ref 3.77–5.28)
SODIUM SERPL-SCNC: 140 MMOL/L (ref 136–145)
WBC NRBC COR # BLD AUTO: 8.16 10*3/MM3 (ref 3.4–10.8)

## 2025-03-10 PROCEDURE — 85025 COMPLETE CBC W/AUTO DIFF WBC: CPT | Performed by: INTERNAL MEDICINE

## 2025-03-10 PROCEDURE — 36415 COLL VENOUS BLD VENIPUNCTURE: CPT

## 2025-03-10 PROCEDURE — 80053 COMPREHEN METABOLIC PANEL: CPT | Performed by: INTERNAL MEDICINE

## 2025-03-10 NOTE — LETTER
"March 10, 2025     Swapna Taylor MD  [address]  [address]  [place] [id number]    Patient: Honey Avila   YOB: 1955   Date of Visit: 3/10/2025     Dear Swapna Taylor MD:       Thank you for referring Honey Avila to me for evaluation. Below are the relevant portions of my assessment and plan of care.    If you have questions, please do not hesitate to call me. I look forward to following Honey along with you.         Sincerely,        Phong Bella MD        CC: No Recipients    Phong Bella Jr., MD  03/10/25 [id number]  Sign when Signing Visit  Chief Complaint  Right breast DCIS    Subjective       History of Present Illness  Patient returns today in follow-up continuing on chemoprevention with raloxifene 60 mg daily (initiated 3/26/2020) x5 years.  She also continues on enhanced surveillance with alternating annual mammogram and breast MRI since her DCIS was poorly visualized on mammogram and patient with increased breast density.  Patient is now completing 5 years of raloxifene and will discontinue the medication at this time.  She had been tolerating treatment well.  She has no complaints today. She did undergo annual mammogram on the left side 1/30/2025 which was negative, BI-RADS 1      Objective  Vital Signs:   /77   Pulse 85   Temp 97.6 °F (36.4 °C) (Oral)   Ht 168 cm (66.14\")   Wt 72.7 kg (160 lb 4.8 oz)   SpO2 95%   BMI 25.76 kg/m²     Physical Exam  Constitutional:       Appearance: She is well-developed.   Eyes:      Conjunctiva/sclera: Conjunctivae normal.   Neck:      Thyroid: No thyromegaly.   Cardiovascular:      Rate and Rhythm: Normal rate and regular rhythm.      Heart sounds: No murmur heard.     No friction rub. No gallop.   Pulmonary:      Effort: No respiratory distress.      Breath sounds: Normal breath sounds.      Comments: Status post right mastectomy with implant reconstruction, no masses or abnormalities palpated.  Left breast without " "masses or abnormalities palpated.  Abdominal:      General: Bowel sounds are normal. There is no distension.      Palpations: Abdomen is soft.      Tenderness: There is no abdominal tenderness.   Musculoskeletal:      Comments: No current edema   Lymphadenopathy:      Head:      Right side of head: No submandibular adenopathy.      Cervical: No cervical adenopathy.      Upper Body:      Right upper body: No supraclavicular adenopathy.      Left upper body: No supraclavicular adenopathy.   Skin:     General: Skin is warm and dry.      Findings: No rash.   Neurological:      Mental Status: She is alert and oriented to person, place, and time.      Cranial Nerves: No cranial nerve deficit.      Motor: No abnormal muscle tone.      Deep Tendon Reflexes: Reflexes normal.   Psychiatric:         Behavior: Behavior normal.       Patient was examined today, unchanged from above      Result Review: Reviewed CBC, CMP from today.  Reviewed mammogram 2025.         Assessment and Plan    *Right breast DCIS ([id number]):  Menarche at age 15, spontaneous menopause at age 45-51.  Uterus is intact.  A1.  She received progesterone to \"stay pregnant\" in the past, no other hormonal treatment.  Family history is significant for a mother with breast cancer (DCIS) at age 79, father with prostate cancer in his 70s, sister with breast cancer at age 64 (patient in our practice), and a paternal uncle with glioblastoma at age 84, maternal grandfather with colon cancer at age 74, maternal first cousin with an abdominal sarcoma at age 25-30.  The patient has undergone routine previous mammograms, reports that she has had frequent six-month follow-up studies performed due to inability to accurately assess her breasts because of increased density.  She has never had a prior breast biopsy.    Mammogram on 2020 showed new calcifications right breast measuring 6 mm.  By ultrasound there was a hypoechoic area in the 8 o'clock position.  "   Biopsy 1/23/2020 of the right breast with finding of DCIS, high-grade with comedonecrosis, associated calcifications, solid, cribriform, and comedo growth patterns, largest area measuring 6 mm, ER negative, DC negative.  Biopsy performed at [facility] and reviewed at [facility] with agreement.    The patient did undergo genetic testing with INVITAE panel test negative 2/6/2020.    Breast MRI 2/13/2020 with finding of a 4.4 cm abnormality in the 8 o'clock position of the right breast with clip in place from prior biopsy.    Given the size of the lesion, patient was felt not to be a candidate for lumpectomy.    Right mastectomy with sentinel lymph node biopsy and tissue expander placement on 3/19/2020 with Dr. Hodges and Dr Gore.  Pathology showed high-grade DCIS, solid and cribriform with associated comedonecrosis.  Margins were negative.  The area of involvement with DCIS measured at least 4 cm.  [place] lymph nodes negative x3.  Given patient's family history and personal history of DCIS, estimated risk of contralateral DCIS or invasive cancer felt to be in the 6-10% range, recommended chemoprevention for risk reduction.  Patient with underlying osteopenia and intact uterus.  Decision to pursue raloxifene 60 mg daily x5 years, initiated 3/26/2020.  Due to poor visualization of prior DCIS on mammogram as well as issues with breast density and difficulty with mammographic evaluation in the past, plan annual follow-up left mammogram and MRI.  Status post tissue expander removal and implant placement on the right side as well as left breast reduction with plastic surgery on 7/20/2020.  Pathology from left breast reduction negative for malignancy.  Chemoprevention with raloxifene discontinued after 5 years on 3/10/2025.  Patient returns today in follow-up continuing on raloxifene 60 mg daily as chemoprevention x 5 years that was initiated on 3/26/2020.  She has now completed 5 years of chemoprevention and we will  discontinue raloxifene at this time.  Patient does continue with enhanced surveillance with MRI and mammogram due to poor visualization of prior DCIS on mammogram and increased breast density.  She would like to continue with enhanced surveillance and I agree.  We will go ahead and schedule her MRI in August 2025 and mammogram in January 2026.  Most recent mammogram on 1/30/2025 was negative, BI-RADS 1.  Exam today was negative.  I will see her back in 1 year for follow-up.    *Family history of colon cancer:  Patient does have a family history of colon cancer in her maternal grandfather at age 74  The patient does continue with routine colonoscopy, last performed in July 2017 anticipating a 5-year interval follow-up.  Patient did undergo follow-up colonoscopy on 7/6/2022 with 1 hyperplastic polyp removed, plan follow-up in 5 years again.    *Epilepsy:  Patient does continue on Keppra, last seizure in 1990    *Chronic intermittent vertigo  Patient has had intermittent chronic issues with vertigo.     *Bilateral lower extremity edema  Patient with chronic bilateral lower extremity edema with some slight asymmetry  Edema and increased and patient underwent Doppler on 7/25/2023 which was negative for DVT  Edema improved, patient wears compression stockings routinely    *Osteopenia  Patient may experience some beneficial effects from raloxifene during her course of chemoprevention  Defer any decision for continuation of raloxifene to her gynecologist beyond the 5-year timeframe.  No evidence to suggest that continuation of raloxifene beyond this timeframe would confer any additional benefit from a chemoprevention standpoint and there are better medications for treatment of osteopenia at that point.      Plan:  Discontinue raloxifene after completion of 5 years treatment (initiated 3/19/2020, discontinued 3/10/2025)  Continue with enhanced surveillance.  Schedule annual mammogram in August 2025 and left screening  mammogram in January 2026   MD visit in 1 year with CBC, CMP

## 2025-08-04 ENCOUNTER — HOSPITAL ENCOUNTER (OUTPATIENT)
Dept: MRI IMAGING | Facility: HOSPITAL | Age: 70
Discharge: HOME | End: 2025-08-04
Admitting: INTERNAL MEDICINE
Payer: MEDICARE

## 2025-08-04 DIAGNOSIS — R92.342 MAMMOGRAPHIC EXTREME DENSITY, LEFT BREAST: ICD-10-CM

## 2025-08-04 DIAGNOSIS — D05.11 DUCTAL CARCINOMA IN SITU (DCIS) OF RIGHT BREAST: ICD-10-CM

## 2025-08-04 PROCEDURE — C8937 CAD BREAST MRI: HCPCS

## 2025-08-04 PROCEDURE — C8908 MRI W/O FOL W/CONT, BREAST,: HCPCS

## 2025-08-04 PROCEDURE — 25510000002 GADOBENATE DIMEGLUMINE 529 MG/ML SOLUTION: Performed by: INTERNAL MEDICINE

## 2025-08-04 PROCEDURE — A9577 INJ MULTIHANCE: HCPCS | Performed by: INTERNAL MEDICINE

## 2025-08-04 RX ADMIN — GADOBENATE DIMEGLUMINE 15 ML: 529 INJECTION, SOLUTION INTRAVENOUS at 16:33

## 2025-08-05 ENCOUNTER — TELEPHONE (OUTPATIENT)
Dept: ONCOLOGY | Facility: CLINIC | Age: 70
End: 2025-08-05
Payer: MEDICARE

## (undated) DEVICE — ANTIBACTERIAL UNDYED BRAIDED (POLYGLACTIN 910), SYNTHETIC ABSORBABLE SUTURE: Brand: COATED VICRYL

## (undated) DEVICE — UNIVERSAL PACK: Brand: CARDINAL HEALTH

## (undated) DEVICE — 3M™ STERI-STRIP™ REINFORCED ADHESIVE SKIN CLOSURES, R1547, 1/2 IN X 4 IN (12 MM X 100 MM), 6 STRIPS/ENVELOPE: Brand: 3M™ STERI-STRIP™

## (undated) DEVICE — GLV SURG PREMIERPRO ORTHO LTX PF SZ8 BRN

## (undated) DEVICE — ASEPTIC FLUID TRANSFER SET: Brand: ASEPTIC FLUID TRANSFER SET

## (undated) DEVICE — PENCL E/S HNDSWTCH SMOKEEVAC HOLSTR 10FT

## (undated) DEVICE — PK CHST BRST 40

## (undated) DEVICE — DRSNG SURESITE WNDW 4X4.5

## (undated) DEVICE — MEDI-VAC YANKAUER SUCTION HANDLE W/BULBOUS TIP: Brand: CARDINAL HEALTH

## (undated) DEVICE — GLV SURG PREMIERPRO ORTHO LTX PF SZ8.5 BRN

## (undated) DEVICE — INTENDED FOR TISSUE SEPARATION, AND OTHER PROCEDURES THAT REQUIRE A SHARP SURGICAL BLADE TO PUNCTURE OR CUT.: Brand: BARD-PARKER ® CARBON RIB-BACK BLADES

## (undated) DEVICE — BIOPATCH™ ANTIMICROBIAL DRESSING WITH CHLORHEXIDINE GLUCONATE IS A HYDROPHILLIC POLYURETHANE ABSORPTIVE FOAM WITH CHLORHEXIDINE GLUCONATE (CHG) WHICH INHIBITS BACTERIAL GROWTH UNDER THE DRESSING. THE DRESSING IS INTENDED TO BE USED TO ABSORB EXUDATE, COVER A WOUND CAUSED BY VASCULAR AND NONVASCULAR PERCUTANEOUS MEDICAL DEVICES DURING SURGERY, AS WELL AS REDUCE LOCAL INFECTION AND COLONIZATION OF MICROORGANISMS.: Brand: BIOPATCH

## (undated) DEVICE — SOL ISO/ALC RUB 70PCT 4OZ

## (undated) DEVICE — SUT VIC 3/0 TIES 18IN J110T

## (undated) DEVICE — PAD,ABDOMINAL,8"X10",ST,LF: Brand: MEDLINE

## (undated) DEVICE — TUBING, SUCTION, 1/4" X 20', STRAIGHT: Brand: MEDLINE INDUSTRIES, INC.

## (undated) DEVICE — ADHS SKIN DERMABOND TOP ADVANCED

## (undated) DEVICE — NDL BLNT 18G 1 1/2IN

## (undated) DEVICE — IRRIGATOR BULB ASEPTO 60CC STRL

## (undated) DEVICE — JACKSON-PRATT 100CC BULB RESERVOIR: Brand: CARDINAL HEALTH

## (undated) DEVICE — 1010 S-DRAPE TOWEL DRAPE 10/BX: Brand: STERI-DRAPE™

## (undated) DEVICE — STPLR SKIN VISISTAT WD 35CT

## (undated) DEVICE — ELECTRD BLD EDGE/INSUL1P 2.4X5.1MM STRL

## (undated) DEVICE — CVR TRANSD CIV FLX TPR 11.9 TO 3.8X61CM

## (undated) DEVICE — SUT ETHLN 3/0 PS1 18IN 1663H

## (undated) DEVICE — SPNG GZ WOVN 4X4IN 12PLY 10/BX STRL

## (undated) DEVICE — SUT SILK 2/0 FS BLK 18IN 685G

## (undated) DEVICE — NEEDLE, QUINCKE, 20GX3.5": Brand: MEDLINE

## (undated) DEVICE — BNDG ELAS ELITE V/CLOSE 6IN 5YD LF STRL

## (undated) DEVICE — SOL NACL 0.9PCT 1000ML

## (undated) DEVICE — SYR LUERLOK 50ML

## (undated) DEVICE — SUT PDS 2/0 SH 27IN Z317H

## (undated) DEVICE — TOTAL TRAY, 16FR 10ML SIL FOLEY, URN: Brand: MEDLINE

## (undated) DEVICE — MARKR SKIN W/RULR AND LBL

## (undated) DEVICE — GLV SURG BIOGEL LTX PF 6 1/2

## (undated) DEVICE — ELECTRD BLD EXT EDGE/INSUL 6IN

## (undated) DEVICE — DRSNG SURESITE WNDW 2.38X2.75

## (undated) DEVICE — NDL HYPO PRECISIONGLIDE REG 25G 1 1/2

## (undated) DEVICE — Device

## (undated) DEVICE — SYR LUERLOK 5CC

## (undated) DEVICE — UNDYED MONOFILAMENT (POLYDIOXANONE), ABSORBABLE SYNTHETIC SURGICAL SUTURE: Brand: PDS

## (undated) DEVICE — SPK PIN NSR FLUID NONVNT 2WY MINI LL LF

## (undated) DEVICE — DRN WND AXIOM W CLOT STOP 10F

## (undated) DEVICE — BIFURCATED DRAIN EXTENSION FOR CLOSED WOUND DRAIN: Brand: AXIOM®

## (undated) DEVICE — Device: Brand: FABCO

## (undated) DEVICE — APPL CHLORAPREP HI/LITE 26ML ORNG

## (undated) DEVICE — SKIN PREP TRAY W/CHG: Brand: MEDLINE INDUSTRIES, INC.